# Patient Record
Sex: FEMALE | Race: WHITE | Employment: FULL TIME | URBAN - METROPOLITAN AREA
[De-identification: names, ages, dates, MRNs, and addresses within clinical notes are randomized per-mention and may not be internally consistent; named-entity substitution may affect disease eponyms.]

---

## 2017-08-16 ENCOUNTER — HOSPITAL ENCOUNTER (OUTPATIENT)
Dept: WOMENS IMAGING | Age: 52
Discharge: HOME | End: 2017-08-16
Payer: OTHER GOVERNMENT

## 2017-08-16 DIAGNOSIS — Z12.31 SCREENING MAMMOGRAM, ENCOUNTER FOR: ICD-10-CM

## 2017-08-16 PROCEDURE — G0202 SCR MAMMO BI INCL CAD: HCPCS

## 2018-08-15 DIAGNOSIS — Z01.419 PAP SMEAR, LOW-RISK: ICD-10-CM

## 2019-04-04 ENCOUNTER — HOSPITAL ENCOUNTER (OUTPATIENT)
Dept: WOMENS IMAGING | Age: 54
Discharge: HOME | End: 2019-04-06
Payer: OTHER GOVERNMENT

## 2019-04-04 DIAGNOSIS — Z12.31 BREAST CANCER SCREENING BY MAMMOGRAM: ICD-10-CM

## 2019-04-04 PROCEDURE — 77067 SCR MAMMO BI INCL CAD: CPT

## 2019-06-14 ENCOUNTER — OFFICE VISIT (OUTPATIENT)
Dept: FAMILY MEDICINE CLINIC | Age: 54
End: 2019-06-14
Payer: OTHER GOVERNMENT

## 2019-06-14 VITALS
DIASTOLIC BLOOD PRESSURE: 76 MMHG | TEMPERATURE: 97.9 F | OXYGEN SATURATION: 98 % | HEIGHT: 66 IN | BODY MASS INDEX: 17.04 KG/M2 | SYSTOLIC BLOOD PRESSURE: 128 MMHG | WEIGHT: 106 LBS | HEART RATE: 64 BPM

## 2019-06-14 DIAGNOSIS — M62.830 SPASM OF LUMBAR PARASPINOUS MUSCLE: ICD-10-CM

## 2019-06-14 DIAGNOSIS — M54.50 ACUTE BILATERAL LOW BACK PAIN WITHOUT SCIATICA: Primary | ICD-10-CM

## 2019-06-14 PROCEDURE — 99213 OFFICE O/P EST LOW 20 MIN: CPT | Performed by: NURSE PRACTITIONER

## 2019-06-14 PROCEDURE — 96372 THER/PROPH/DIAG INJ SC/IM: CPT | Performed by: NURSE PRACTITIONER

## 2019-06-14 RX ORDER — METHYLPREDNISOLONE 4 MG/1
TABLET ORAL
Qty: 1 KIT | Refills: 0 | Status: SHIPPED | OUTPATIENT
Start: 2019-06-14 | End: 2019-06-20

## 2019-06-14 RX ORDER — CYCLOBENZAPRINE HCL 10 MG
TABLET ORAL
Qty: 21 TABLET | Refills: 0 | Status: SHIPPED | OUTPATIENT
Start: 2019-06-14

## 2019-06-14 RX ORDER — LIDOCAINE 4 G/G
1 PATCH TOPICAL DAILY
Qty: 30 PATCH | Refills: 0 | Status: SHIPPED | OUTPATIENT
Start: 2019-06-14 | End: 2019-07-14

## 2019-06-14 RX ORDER — TRIAMCINOLONE ACETONIDE 40 MG/ML
60 INJECTION, SUSPENSION INTRA-ARTICULAR; INTRAMUSCULAR ONCE
Status: COMPLETED | OUTPATIENT
Start: 2019-06-14 | End: 2019-06-14

## 2019-06-14 RX ADMIN — TRIAMCINOLONE ACETONIDE 60 MG: 40 INJECTION, SUSPENSION INTRA-ARTICULAR; INTRAMUSCULAR at 15:33

## 2019-06-14 ASSESSMENT — ENCOUNTER SYMPTOMS: BACK PAIN: 1

## 2019-06-14 NOTE — PROGRESS NOTES
After obtaining consent, and per orders of Marcos Frankel, injection of Kenalog given in Right upper quad. gluteus by Ermias Newsome. Patient instructed to remain in clinic for 20 minutes afterwards, and to report any adverse reaction to me immediately.

## 2019-06-14 NOTE — PROGRESS NOTES
Subjective  Gina Cruz, 52 y.o. female presents today with:  Chief Complaint   Patient presents with    Lower Back Pain     Pt states she is having middle lower back pain, that started about a week ago. Pt has taken advil. Pt has tried heat and ice. Pt has also tried going to a chiropractor two times. Pts chiropractor thinks she has a pinched nerve and a pulled muscle and advised her to come here. Pt has tried the  Home	Parks and would like to know if should get more. Pt is a 52 y.o. female who presents for middle-lower back pain. Onset was one week ago. Symptoms are worsening since then. Pain is described as aching, intermittently sharp, non-radiating. Moderate to severe in nature. Pt has tried topical [employer] and [employer], applying ice pack to the area fo 20 minutes at a time w/o improvement. She did have some improvement w/ 5% lidocaine patch from her neighbor. She saw her chiropractor for the symptoms - he thinks she has a pinched nerve    Pt's , Bernabe Triana, is w/her at this visit. Bernabe Triana states he was here for same recently and received steroid shot w/ good relief. Review of Systems   Constitutional: Negative for chills and fever. Respiratory: Negative for chest tightness and shortness of breath. Cardiovascular: Negative for chest pain, palpitations and leg swelling. Gastrointestinal: Negative for abdominal pain, constipation, diarrhea, nausea and vomiting. Genitourinary: Negative for dysuria, enuresis, hematuria and urgency. Musculoskeletal: Positive for back pain. Negative for gait problem, joint swelling, myalgias, neck pain and neck stiffness. Skin: Negative for rash. Allergic/Immunologic: Negative for immunocompromised state. Neurological: Negative for dizziness, weakness, numbness and headaches. Hematological: Does not bruise/bleed easily.      Objective  Vitals:    06/14/19 1448   BP: 128/76   Pulse: 64   Temp: 97.9 °F (36.6 °C)   SpO2: 98%   Weight: 106 lb (48.1 kg) Height: 5' 5.5\" (1.664 m)     Physical Exam   Constitutional: She is oriented to person, place, and time. She appears well-developed and well-nourished. She is cooperative. Non-toxic appearance. No distress. HENT:   Head: Normocephalic and atraumatic. Right Ear: External ear normal.   Left Ear: External ear normal.   Nose: Nose normal.   Mouth/Throat: Oropharynx is clear and moist and mucous membranes are normal.   Eyes: Pupils are equal, round, and reactive to light. Conjunctivae, EOM and lids are normal.   Neck: Trachea normal, normal range of motion and phonation normal. Neck supple. No JVD present. Carotid bruit is not present. Cardiovascular: Normal rate, regular rhythm, S1 normal, S2 normal and normal pulses. Exam reveals no gallop and no friction rub. No murmur heard. Pulses:       Dorsalis pedis pulses are 2+ on the right side, and 2+ on the left side. Pulmonary/Chest: Effort normal and breath sounds normal. No tachypnea. She has no decreased breath sounds. She has no wheezes. She has no rales. She exhibits no tenderness. Abdominal: Soft. Normal appearance and bowel sounds are normal. She exhibits no distension. There is no tenderness. There is no CVA tenderness. Musculoskeletal: Normal range of motion. She exhibits tenderness. She exhibits no edema or deformity. Right hip: Normal.        Left hip: Normal.        Lumbar back: She exhibits tenderness and spasm. She exhibits normal range of motion, no bony tenderness, no swelling, no edema, no deformity, no laceration, no pain and normal pulse. Lymphadenopathy:     She has no cervical adenopathy. Right: No supraclavicular adenopathy present. Left: No supraclavicular adenopathy present. Neurological: She is alert and oriented to person, place, and time. She has normal strength. No sensory deficit. Coordination and gait normal.   Skin: Skin is warm, dry and intact. Capillary refill takes less than 2 seconds.  No rash noted. No erythema. Psychiatric: She has a normal mood and affect. Her speech is normal and behavior is normal.   Vitals reviewed. POC Testing Today: None    Assessment & Plan   52 y.o. female presents w/ one week of low back pain, atraumatic, afebrile. Given history and exam, suspect likely musculoskeletal etiology. Pt is nontoxic appearing w/ no red flags concerning for cauda equina. Neurovascularly intact distally. No peritoneal signs or abdominal pain w/low suspicion for AAA. Plan: in-office IM steroid, Rx muscle relaxant, lidocaine patch, medrol dose-moise to begin if symptoms are recur after initial improvement following IM kenalog, instructions for supportive care, return precautions/follow-up w/ PCP for possible imaging/work-up if symptoms persist.   Diagnoses and all orders for this visit:    Acute bilateral low back pain without sciatica  -     lidocaine 4 % external patch; Place 1 patch onto the skin daily  -     methylPREDNISolone (MEDROL, MOISE,) 4 MG tablet; Take by mouth. - Do not sit, drive, or  one place for more than 30 minutes at a time. - Take short walks on level surfaces as soon as pain allows. - Do not stay in bed. Resting more than 1 or 2 days can delay your recovery  - SEEK MEDICAL CARE IF: symptoms fail to improve in 1 week or worsen in any way  in 1 week  - SEEK IMMEDIATE MEDICAL CARE IF:   · Pain radiates from back into legs. · New bowel or bladder control problems develop  · Unusual weakness or numbness in your arms or legs  · Nausea/ vomiting, abdominal pain, feeling faint    Spasm of lumbar paraspinous muscle  -     triamcinolone acetonide (KENALOG-40) injection 60 mg  -     cyclobenzaprine (FLEXERIL) 10 MG tablet; Start with 1/2 tab. Do not drive or operate machinery while taking muscle relaxers. Oral Steroid Instructions: Take each dose with a small snack or meal to lessen potential GI upset. Follow dosing instructions provided with prescription.   Common side effects include difficulty sleeping and irritability. Take full course as ordered. Return if symptoms worsen or fail to improve, for Follow-up with your Primary Care Provider. Side effects and adverse effects of any medication prescribed today, as well as treatment plan/rationale, follow-up care, and result expectations have been discussed with the patient. Nanda Maravilla expresses understanding and wishes to proceed with the plan. Discussed signs and symptoms which require immediate follow-up in ED/call to 911. Understanding verbalized. I have reviewed and updated the electronic medical record.     Kasi Holguin, APRN - CNP

## 2019-06-14 NOTE — PATIENT INSTRUCTIONS
skin.  ? Try ice or a cold pack on the area for 10 to 20 minutes at a time. Put a thin cloth between the ice and your skin.     · For most back pain you can take over-the-counter pain medicine. Nonsteroidal anti-inflammatory drugs (NSAIDs) such as ibuprofen or naproxen seem to work best. But if you can't take NSAIDs you can try acetaminophen. Your doctor can prescribe stronger medicines if needed. Be safe with medicines. Read and follow all instructions on the label.    Body positions and posture    · Sit or lie in positions that are most comfortable for you and that reduce pain. Try one of these positions when you lie down:  ? Lie on your back with your knees bent and supported by large pillows. ? Lie on the floor with your legs on the seat of a sofa or chair. ? Lie on your side with your knees and hips bent and a pillow between your legs. ? Lie on your stomach if it does not make pain worse.     · Do not sit up in bed. Avoid soft couches and twisted positions.     · Avoid bed rest after the first day of back pain. Bed rest can help relieve pain at first, but it delays healing. Continued rest without activity is usually not good for your back.     · If you must sit for long periods of time, take breaks from sitting. Change positions every 30 minutes. Get up and walk around, or lie in a comfortable position. Activity    · Take short walks several times a day. You can start with 5 to 10 minutes, 3 or 4 times a day, and work up to longer walks. Walk on level surfaces and avoid hills and stairs until your back starts to feel better.     · After your back spasm starts to feel better, try to stretch your muscles every day, especially before and after exercise and at bedtime. Regular stretching can help relax your muscles.     · To prevent future back pain, do exercises to stretch and strengthen your back and stomach.  Learn to use good posture, safe lifting techniques, and other ways to move to help you avoid back pain.   When should you call for help? Call 911 anytime you think you may need emergency care. For example, call if:    · You are unable to move an arm or a leg at all.   [facility] your doctor now or seek immediate medical care if:    · You have new or worse symptoms in your legs, belly, or buttocks. Symptoms may include:  ? Numbness or tingling. ? Weakness. ? Pain.     · You lose bladder or bowel control.    Watch closely for changes in your health, and be sure to contact your doctor if:    · You have a fever, lose weight, or don't feel well.     · You do not get better as expected. Where can you learn more? Go to https://[employer]. org and sign in to your [employer] account. Enter E232 in the ViewRay box to learn more about \"Back Spasm: Care Instructions. \"     If you do not have an account, please click on the \"Sign Up Now\" link. Current as of: September 20, 2018  Content Version: 12.0  © 0006-3435 [employer], Incorporated. Care instructions adapted under license by [facility] [employer] [facility] ([facility] [place]). If you have questions about a medical condition or this instruction, always ask your healthcare professional. [place] [address] any warranty or liability for your use of this information.

## 2019-06-16 ENCOUNTER — TELEPHONE (OUTPATIENT)
Dept: FAMILY MEDICINE CLINIC | Age: 54
End: 2019-06-16

## 2019-06-25 ASSESSMENT — ENCOUNTER SYMPTOMS
CHEST TIGHTNESS: 0
VOMITING: 0
NAUSEA: 0
SHORTNESS OF BREATH: 0
DIARRHEA: 0
CONSTIPATION: 0
ABDOMINAL PAIN: 0

## 2020-10-13 ENCOUNTER — OFFICE VISIT (OUTPATIENT)
Dept: URBAN - METROPOLITAN AREA CLINIC 7 | Facility: CLINIC | Age: 55
End: 2020-10-13

## 2020-10-16 ENCOUNTER — LAB OUTSIDE AN ENCOUNTER (OUTPATIENT)
Age: 55
End: 2020-10-16

## 2020-10-22 ENCOUNTER — TELEPHONE ENCOUNTER (OUTPATIENT)
Dept: URBAN - METROPOLITAN AREA CLINIC 9 | Facility: CLINIC | Age: 55
End: 2020-10-22

## 2020-10-22 LAB — PANCREATIC ELASTASE-1: (no result)

## 2020-10-27 LAB — HELICOBACTER PYLORI AG, EIA, STOOL: (no result)

## 2020-11-02 ENCOUNTER — TELEPHONE ENCOUNTER (OUTPATIENT)
Dept: URBAN - METROPOLITAN AREA CLINIC 9 | Facility: CLINIC | Age: 55
End: 2020-11-02

## 2020-11-02 ENCOUNTER — OFFICE VISIT (OUTPATIENT)
Dept: URBAN - METROPOLITAN AREA CLINIC 7 | Facility: CLINIC | Age: 55
End: 2020-11-02

## 2020-11-10 ENCOUNTER — TELEPHONE ENCOUNTER (OUTPATIENT)
Dept: URBAN - METROPOLITAN AREA CLINIC 9 | Facility: CLINIC | Age: 55
End: 2020-11-10

## 2020-11-16 ENCOUNTER — TELEPHONE ENCOUNTER (OUTPATIENT)
Dept: URBAN - METROPOLITAN AREA CLINIC 9 | Facility: CLINIC | Age: 55
End: 2020-11-16

## 2020-12-08 ENCOUNTER — TELEPHONE ENCOUNTER (OUTPATIENT)
Dept: URBAN - METROPOLITAN AREA CLINIC 9 | Facility: CLINIC | Age: 55
End: 2020-12-08

## 2020-12-14 ENCOUNTER — TELEPHONE ENCOUNTER (OUTPATIENT)
Dept: URBAN - METROPOLITAN AREA CLINIC 9 | Facility: CLINIC | Age: 55
End: 2020-12-14

## 2021-01-01 ENCOUNTER — OFFICE VISIT (OUTPATIENT)
Age: 56
End: 2021-01-01

## 2021-01-21 ENCOUNTER — OFFICE VISIT (OUTPATIENT)
Dept: URBAN - METROPOLITAN AREA CLINIC 7 | Facility: CLINIC | Age: 56
End: 2021-01-21

## 2021-01-26 ENCOUNTER — TELEPHONE ENCOUNTER (OUTPATIENT)
Dept: URBAN - METROPOLITAN AREA CLINIC 9 | Facility: CLINIC | Age: 56
End: 2021-01-26

## 2021-01-26 ENCOUNTER — OFFICE VISIT (OUTPATIENT)
Dept: URBAN - METROPOLITAN AREA CLINIC 7 | Facility: CLINIC | Age: 56
End: 2021-01-26

## 2021-01-27 ENCOUNTER — OFFICE VISIT (OUTPATIENT)
Dept: URBAN - METROPOLITAN AREA CLINIC 7 | Facility: CLINIC | Age: 56
End: 2021-01-27

## 2021-03-08 ENCOUNTER — TELEPHONE ENCOUNTER (OUTPATIENT)
Dept: URBAN - METROPOLITAN AREA CLINIC 9 | Facility: CLINIC | Age: 56
End: 2021-03-08

## 2021-03-08 ENCOUNTER — OFFICE VISIT (OUTPATIENT)
Dept: URBAN - METROPOLITAN AREA CLINIC 7 | Facility: CLINIC | Age: 56
End: 2021-03-08

## 2021-08-09 PROBLEM — Z96.89 PRESENCE OF PANCREATIC DUCT STENT: Status: ACTIVE | Noted: 2020-12-04

## 2021-08-09 PROBLEM — E87.1 HYPONATREMIA: Status: ACTIVE | Noted: 2021-07-20

## 2021-08-09 PROBLEM — R11.10 INTRACTABLE VOMITING: Status: ACTIVE | Noted: 2021-08-09

## 2021-08-10 ENCOUNTER — OFFICE VISIT (OUTPATIENT)
Dept: OBGYN CLINIC | Age: 56
End: 2021-08-10
Payer: OTHER GOVERNMENT

## 2021-08-10 VITALS
HEART RATE: 86 BPM | DIASTOLIC BLOOD PRESSURE: 82 MMHG | SYSTOLIC BLOOD PRESSURE: 106 MMHG | BODY MASS INDEX: 17.37 KG/M2 | WEIGHT: 106 LBS

## 2021-08-10 DIAGNOSIS — N94.89 PELVIC CONGESTION: Primary | ICD-10-CM

## 2021-08-10 DIAGNOSIS — N94.9 GENITAL ATROPHY OF FEMALE: ICD-10-CM

## 2021-08-10 PROCEDURE — 99204 OFFICE O/P NEW MOD 45 MIN: CPT | Performed by: OBSTETRICS & GYNECOLOGY

## 2021-08-10 RX ORDER — ESTRADIOL 0.1 MG/G
CREAM VAGINAL
Qty: 1 TUBE | Refills: 3 | Status: SHIPPED | OUTPATIENT
Start: 2021-08-10

## 2021-08-10 RX ORDER — ALPRAZOLAM 1 MG/1
1 TABLET ORAL PRN
COMMUNITY
Start: 2021-01-13

## 2021-08-10 RX ORDER — ESTRADIOL 0.1 MG/D
1 FILM, EXTENDED RELEASE TRANSDERMAL
Qty: 8 PATCH | Refills: 3 | Status: SHIPPED | OUTPATIENT
Start: 2021-08-12 | End: 2022-01-13

## 2021-08-10 RX ORDER — ACETAMINOPHEN 325 MG/1
650 TABLET ORAL EVERY 6 HOURS
COMMUNITY
Start: 2021-01-31

## 2021-08-10 RX ORDER — BENAZEPRIL HYDROCHLORIDE 20 MG/1
20 TABLET ORAL DAILY
COMMUNITY
Start: 2021-07-22

## 2021-08-10 RX ORDER — LUBIPROSTONE 8 UG/1
CAPSULE, GELATIN COATED ORAL
COMMUNITY
Start: 2021-07-25

## 2021-08-10 ASSESSMENT — ENCOUNTER SYMPTOMS
BACK PAIN: 0
ABDOMINAL DISTENTION: 0
VOMITING: 0
COUGH: 0
TROUBLE SWALLOWING: 0
COLOR CHANGE: 0
ABDOMINAL PAIN: 0
SORE THROAT: 0
WHEEZING: 0
BLOOD IN STOOL: 0
SHORTNESS OF BREATH: 0
CHEST TIGHTNESS: 0
VOICE CHANGE: 0
CONSTIPATION: 0
NAUSEA: 0

## 2021-08-10 NOTE — PROGRESS NOTES
HPI:  Lennis Player (: 1965) is a 60 y.o. female, New patient, here for evaluation of the following chief complaint(s):  Follow-up (pelvic congestion found on ct)  Patient is being evaluated for bloating and epigastric and periumbilical discomfort she has had multiple pancreatic issues in the past and she had a CAT scan which showed pelvic congestion. She denies pelvic pain she does have vaginal dryness and discomfort due to the vaginal atrophy. She has not had any gynecologic problems in the past she is menopausal and has not been using any estrogen in the past      SUBJECTIVE/OBJECTIVE:    Past Surgical History:   Procedure Laterality Date    PANCREAS SURGERY          Review of Systems   Constitutional: Negative for activity change, appetite change, fatigue and unexpected weight change. HENT: Negative for dental problem, ear pain, hearing loss, nosebleeds, sore throat, trouble swallowing and voice change. Eyes: Negative for visual disturbance. Respiratory: Negative for cough, chest tightness, shortness of breath and wheezing. Cardiovascular: Negative for chest pain and palpitations. Gastrointestinal: Negative for abdominal distention, abdominal pain, blood in stool, constipation, nausea and vomiting. Endocrine: Negative for cold intolerance, heat intolerance, polydipsia, polyphagia and polyuria. Genitourinary: Negative for difficulty urinating, dyspareunia, dysuria, flank pain, frequency, genital sores, hematuria, menstrual problem, pelvic pain, urgency, vaginal bleeding, vaginal discharge and vaginal pain. Musculoskeletal: Negative for arthralgias, back pain, joint swelling and myalgias. Skin: Negative for color change and rash. Allergic/Immunologic: Negative for environmental allergies, food allergies and immunocompromised state. Neurological: Negative for dizziness, seizures, syncope, speech difficulty, weakness, numbness and headaches.    Hematological: Negative for adenopathy. Does not bruise/bleed easily. Psychiatric/Behavioral: Negative for agitation, behavioral problems, confusion, decreased concentration, dysphoric mood and suicidal ideas. The patient is not nervous/anxious and is not hyperactive. Physical Exam  Vitals and nursing note reviewed. Exam conducted with a chaperone present. Constitutional:       Appearance: She is well-developed. HENT:      Head: Normocephalic and atraumatic. Eyes:      Pupils: Pupils are equal, round, and reactive to light. Neck:      Thyroid: No thyromegaly. Trachea: No tracheal deviation. Cardiovascular:      Rate and Rhythm: Normal rate and regular rhythm. Heart sounds: Normal heart sounds. Pulmonary:      Effort: Pulmonary effort is normal.      Breath sounds: Normal breath sounds. Chest:      Chest wall: No tenderness. Abdominal:      General: Bowel sounds are normal. There is no distension. Palpations: Abdomen is soft. There is no mass. Tenderness: There is no abdominal tenderness. There is no guarding or rebound. Hernia: There is no hernia in the left inguinal area. Genitourinary:     Labia:         Right: No rash, tenderness, lesion or injury. Left: No rash, tenderness, lesion or injury. Vagina: Normal. No foreign body. No vaginal discharge, erythema, tenderness or bleeding. Cervix: No cervical motion tenderness or discharge. Uterus: Not deviated, not enlarged, not fixed and not tender. Adnexa:         Right: No mass, tenderness or fullness. Left: No mass, tenderness or fullness. Rectum: Normal. No mass, tenderness, anal fissure, external hemorrhoid or internal hemorrhoid. Normal anal tone. Musculoskeletal:         General: Normal range of motion. Cervical back: Normal range of motion. Lymphadenopathy:      Cervical: No cervical adenopathy.    Neurological:      Mental Status: She is alert and oriented to person, place, and time. Vitals:    08/10/21 1338   BP: 106/82   Pulse: 86   Weight: 106 lb (48.1 kg)         ASSESSMENT/PLAN:    Pelvic congestion  Genital atrophy  Dyspareunia    PLAN: Premarin vaginal cream half a gram nightly for a month then twice weekly  Vivelle dot 0.05 patch twice weekly patient to follow-up in 1 month and will discuss with her the use of progesterone at that time      No follow-ups on file. On this date 8/10/2021 I have spent 40 minutes reviewing previous notes, test results and face to face with the patient discussing the diagnosis and importance of compliance with the treatment plan as well as documenting on the day of the visit. An electronic signature was used to authenticate this note. Please note this report has been partially produced using speech recognition software and may cause contain errors related to that system including grammar, punctuation and spelling as well as words and phrases that may seem inappropriate. If there are questions or concerns please feel free to contact me to clarify.

## 2021-09-17 ENCOUNTER — OFFICE VISIT (OUTPATIENT)
Dept: OBGYN CLINIC | Age: 56
End: 2021-09-17
Payer: OTHER GOVERNMENT

## 2021-09-17 VITALS
HEART RATE: 88 BPM | BODY MASS INDEX: 17.37 KG/M2 | SYSTOLIC BLOOD PRESSURE: 126 MMHG | WEIGHT: 106 LBS | DIASTOLIC BLOOD PRESSURE: 80 MMHG

## 2021-09-17 DIAGNOSIS — N94.89 PELVIC CONGESTION: ICD-10-CM

## 2021-09-17 DIAGNOSIS — N94.9 GENITAL ATROPHY OF FEMALE: Primary | ICD-10-CM

## 2021-09-17 PROCEDURE — 99213 OFFICE O/P EST LOW 20 MIN: CPT | Performed by: OBSTETRICS & GYNECOLOGY

## 2021-09-17 RX ORDER — ONDANSETRON 4 MG/1
TABLET, FILM COATED ORAL
COMMUNITY
Start: 2021-08-25

## 2021-09-17 RX ORDER — PAROXETINE 10 MG/1
TABLET, FILM COATED ORAL
COMMUNITY
Start: 2021-09-07

## 2021-09-17 RX ORDER — AMITRIPTYLINE HYDROCHLORIDE 25 MG/1
TABLET, FILM COATED ORAL
COMMUNITY
Start: 2021-09-16

## 2021-09-17 RX ORDER — LINACLOTIDE 290 UG/1
CAPSULE, GELATIN COATED ORAL
COMMUNITY
Start: 2021-09-08 | End: 2022-04-12

## 2021-09-17 ASSESSMENT — ENCOUNTER SYMPTOMS
ABDOMINAL PAIN: 0
CONSTIPATION: 0
ABDOMINAL DISTENTION: 0
BLOOD IN STOOL: 0
TROUBLE SWALLOWING: 0
VOICE CHANGE: 0
COLOR CHANGE: 0
NAUSEA: 0
WHEEZING: 0
VOMITING: 0
SORE THROAT: 0
BACK PAIN: 0
SHORTNESS OF BREATH: 0
COUGH: 0
CHEST TIGHTNESS: 0

## 2021-09-17 NOTE — PROGRESS NOTES
HPI:  Elinor Bhatti (: 1965) is a 60 y.o. female, Established patient, here for evaluation of the following chief complaint(s):  Follow-up (saw GI after her last appt here. Started on meds for IBS and was reluctant to start too many meds at the same time.)  Patient was seen month or so ago and on hormone therapy for vasomotor symptoms as well as for genital atrophy. But she then saw her GI who started her on some other medications so she held off on beginning that medication. SUBJECTIVE/OBJECTIVE:    Past Surgical History:   Procedure Laterality Date    PANCREAS SURGERY          Review of Systems   Constitutional: Negative for activity change, appetite change, fatigue and unexpected weight change. HENT: Negative for dental problem, ear pain, hearing loss, nosebleeds, sore throat, trouble swallowing and voice change. Eyes: Negative for visual disturbance. Respiratory: Negative for cough, chest tightness, shortness of breath and wheezing. Cardiovascular: Negative for chest pain and palpitations. Gastrointestinal: Negative for abdominal distention, abdominal pain, blood in stool, constipation, nausea and vomiting. Endocrine: Negative for cold intolerance, heat intolerance, polydipsia, polyphagia and polyuria. Genitourinary: Negative for difficulty urinating, dyspareunia, dysuria, flank pain, frequency, genital sores, hematuria, menstrual problem, pelvic pain, urgency, vaginal bleeding, vaginal discharge and vaginal pain. Musculoskeletal: Negative for arthralgias, back pain, joint swelling and myalgias. Skin: Negative for color change and rash. Allergic/Immunologic: Negative for environmental allergies, food allergies and immunocompromised state. Neurological: Negative for dizziness, seizures, syncope, speech difficulty, weakness, numbness and headaches. Hematological: Negative for adenopathy. Does not bruise/bleed easily.    Psychiatric/Behavioral: Negative for agitation, behavioral problems, confusion, decreased concentration, dysphoric mood and suicidal ideas. The patient is not nervous/anxious and is not hyperactive. Physical Exam  Vitals and nursing note reviewed. Constitutional:       Appearance: Normal appearance. Neurological:      Mental Status: She is alert. Vitals:    09/17/21 1129   BP: 126/80   Pulse: 88   Weight: 106 lb (48.1 kg)         ASSESSMENT/PLAN:    Menopause  Vasomotor symptoms  Lower genital atrophy    PLAN: reCommend the patient begin the vaginal estrogen and follow-up in 1 month. Use half a gram nightly for 1 month and then changed to twice a week follow-up here 4 weeks. Advised that she hold off on taking the estrogen patch till she has gotten comfortable with the amitriptyline. No follow-ups on file. On this date 9/17/2021 I have spent 20 minutes reviewing previous notes, test results and face to face with the patient discussing the diagnosis and importance of compliance with the treatment plan as well as documenting on the day of the visit. An electronic signature was used to authenticate this note. Please note this report has been partially produced using speech recognition software and may cause contain errors related to that system including grammar, punctuation and spelling as well as words and phrases that may seem inappropriate. If there are questions or concerns please feel free to contact me to clarify.

## 2021-11-01 RX ORDER — POLYETHYLENE GLYCOL 3350 17 G/17G
POWDER, FOR SOLUTION ORAL
COMMUNITY
Start: 2021-02-01

## 2021-11-30 ENCOUNTER — OFFICE VISIT (OUTPATIENT)
Dept: URBAN - METROPOLITAN AREA CLINIC 7 | Facility: CLINIC | Age: 56
End: 2021-11-30

## 2021-11-30 ENCOUNTER — TELEPHONE ENCOUNTER (OUTPATIENT)
Dept: URBAN - METROPOLITAN AREA CLINIC 9 | Facility: CLINIC | Age: 56
End: 2021-11-30

## 2021-12-01 ENCOUNTER — OFFICE VISIT (OUTPATIENT)
Age: 56
End: 2021-12-01

## 2021-12-03 ENCOUNTER — TELEPHONE ENCOUNTER (OUTPATIENT)
Dept: URBAN - METROPOLITAN AREA CLINIC 9 | Facility: CLINIC | Age: 56
End: 2021-12-03

## 2021-12-15 ENCOUNTER — OFFICE VISIT (OUTPATIENT)
Dept: URBAN - METROPOLITAN AREA CLINIC 7 | Facility: CLINIC | Age: 56
End: 2021-12-15

## 2021-12-20 ENCOUNTER — OFFICE VISIT (OUTPATIENT)
Dept: URBAN - METROPOLITAN AREA CLINIC 7 | Facility: CLINIC | Age: 56
End: 2021-12-20

## 2021-12-23 ENCOUNTER — TELEPHONE ENCOUNTER (OUTPATIENT)
Dept: URBAN - METROPOLITAN AREA CLINIC 9 | Facility: CLINIC | Age: 56
End: 2021-12-23

## 2021-12-30 ENCOUNTER — TELEPHONE ENCOUNTER (OUTPATIENT)
Dept: URBAN - METROPOLITAN AREA CLINIC 9 | Facility: CLINIC | Age: 56
End: 2021-12-30

## 2022-01-04 ENCOUNTER — TELEPHONE ENCOUNTER (OUTPATIENT)
Dept: URBAN - METROPOLITAN AREA CLINIC 9 | Facility: CLINIC | Age: 57
End: 2022-01-04

## 2022-01-05 ENCOUNTER — TELEPHONE ENCOUNTER (OUTPATIENT)
Dept: URBAN - METROPOLITAN AREA CLINIC 9 | Facility: CLINIC | Age: 57
End: 2022-01-05

## 2022-01-07 ENCOUNTER — OFFICE VISIT (OUTPATIENT)
Dept: URBAN - METROPOLITAN AREA CLINIC 7 | Facility: CLINIC | Age: 57
End: 2022-01-07

## 2022-01-10 ENCOUNTER — OFFICE VISIT (OUTPATIENT)
Dept: URBAN - METROPOLITAN AREA CLINIC 7 | Facility: CLINIC | Age: 57
End: 2022-01-10

## 2022-01-12 ENCOUNTER — TELEPHONE ENCOUNTER (OUTPATIENT)
Dept: URBAN - METROPOLITAN AREA CLINIC 9 | Facility: CLINIC | Age: 57
End: 2022-01-12

## 2022-01-13 RX ORDER — ESTRADIOL 0.1 MG/D
FILM, EXTENDED RELEASE TRANSDERMAL
Qty: 25 PATCH | Refills: 3 | Status: SHIPPED | OUTPATIENT
Start: 2022-01-13

## 2022-01-19 ENCOUNTER — TELEPHONE ENCOUNTER (OUTPATIENT)
Dept: URBAN - METROPOLITAN AREA CLINIC 9 | Facility: CLINIC | Age: 57
End: 2022-01-19

## 2022-01-21 ENCOUNTER — TELEMEDICINE (OUTPATIENT)
Dept: OBGYN CLINIC | Age: 57
End: 2022-01-21
Payer: OTHER GOVERNMENT

## 2022-01-21 DIAGNOSIS — N94.9 GENITAL ATROPHY OF FEMALE: Primary | ICD-10-CM

## 2022-01-21 DIAGNOSIS — N94.89 PELVIC CONGESTION: ICD-10-CM

## 2022-01-21 PROCEDURE — 99442 PR PHYS/QHP TELEPHONE EVALUATION 11-20 MIN: CPT | Performed by: OBSTETRICS & GYNECOLOGY

## 2022-01-21 NOTE — PROGRESS NOTES
HPI:  Shira Lozano (: 1965) is a 60 y.o. female, Established patient, here for evaluation of the following chief complaint(s):  No chief complaint on file. Virtual phone visit with Dolores Quinonez she is in [place] here and I am here in the office. Was requesting my advice regarding possible surgical evaluation for a rectocele. She had a decography test done. Showed a small rectocele. She has an appointment with a new gastroenterologist to have her pancreas reevaluated and they advised that she see a urogynecologist    SUBJECTIVE/OBJECTIVE:    Past Surgical History:   Procedure Laterality Date    PANCREAS SURGERY          Review of Systems    Physical Exam    There were no vitals filed for this visit. ASSESSMENT/PLAN: Pelvic pain  Constipation  Rectocele    PLAN: Recommend patient to follow-up with the appointment for the urogynecologist.  Advised that she continue using the Premarin vaginal cream.      No follow-ups on file. On this date 2022 I have spent 20 minutes reviewing previous notes, test results and face to face with the patient discussing the diagnosis and importance of compliance with the treatment plan as well as documenting on the day of the visit. An electronic signature was used to authenticate this note. Please note this report has been partially produced using speech recognition software and may cause contain errors related to that system including grammar, punctuation and spelling as well as words and phrases that may seem inappropriate. If there are questions or concerns please feel free to contact me to clarify.

## 2022-01-28 ENCOUNTER — TELEPHONE ENCOUNTER (OUTPATIENT)
Dept: URBAN - METROPOLITAN AREA CLINIC 9 | Facility: CLINIC | Age: 57
End: 2022-01-28

## 2022-04-12 ENCOUNTER — OFFICE VISIT (OUTPATIENT)
Dept: OBGYN CLINIC | Age: 57
End: 2022-04-12
Payer: OTHER GOVERNMENT

## 2022-04-12 VITALS
SYSTOLIC BLOOD PRESSURE: 138 MMHG | DIASTOLIC BLOOD PRESSURE: 76 MMHG | WEIGHT: 106 LBS | HEART RATE: 94 BPM | BODY MASS INDEX: 17.37 KG/M2

## 2022-04-12 DIAGNOSIS — N76.0 ACUTE VAGINITIS: ICD-10-CM

## 2022-04-12 DIAGNOSIS — N76.0 ACUTE VAGINITIS: Primary | ICD-10-CM

## 2022-04-12 PROCEDURE — 99213 OFFICE O/P EST LOW 20 MIN: CPT | Performed by: OBSTETRICS & GYNECOLOGY

## 2022-04-12 RX ORDER — PANCRELIPASE LIPASE, PANCRELIPASE PROTEASE, PANCRELIPASE AMYLASE 10000; 32000; 42000 [USP'U]/1; [USP'U]/1; [USP'U]/1
CAPSULE, DELAYED RELEASE ORAL
COMMUNITY
Start: 2022-01-20

## 2022-04-12 RX ORDER — AMITRIPTYLINE HYDROCHLORIDE 10 MG/1
5 TABLET, FILM COATED ORAL
COMMUNITY
Start: 2022-01-07

## 2022-04-12 RX ORDER — OMEPRAZOLE 20 MG/1
CAPSULE, DELAYED RELEASE ORAL
COMMUNITY

## 2022-04-12 RX ORDER — LINACLOTIDE 72 UG/1
CAPSULE, GELATIN COATED ORAL
COMMUNITY
Start: 2022-01-20

## 2022-04-12 RX ORDER — ONDANSETRON 4 MG/1
TABLET, ORALLY DISINTEGRATING ORAL
COMMUNITY
Start: 2022-01-14

## 2022-04-12 RX ORDER — DULOXETIN HYDROCHLORIDE 20 MG/1
CAPSULE, DELAYED RELEASE ORAL
COMMUNITY
Start: 2022-01-13

## 2022-04-12 RX ORDER — PRUCALOPRIDE 2 MG/1
TABLET, FILM COATED ORAL
COMMUNITY

## 2022-04-12 RX ORDER — PHENOBARBITAL, HYOSCYAMINE SULFATE, ATROPINE SULFATE, SCOPOLAMINE HYDROBROMIDE .0194; .1037; 16.2; .0065 MG/5ML; MG/5ML; MG/5ML; MG/5ML
5 ELIXIR ORAL EVERY 6 HOURS PRN
COMMUNITY
Start: 2021-11-13

## 2022-04-12 RX ORDER — PSEUDOEPHEDRINE HCL 30 MG
TABLET ORAL
COMMUNITY

## 2022-04-12 ASSESSMENT — ENCOUNTER SYMPTOMS
COUGH: 0
BACK PAIN: 0
ABDOMINAL DISTENTION: 0
SHORTNESS OF BREATH: 0
VOMITING: 0
NAUSEA: 0
COLOR CHANGE: 0
WHEEZING: 0
TROUBLE SWALLOWING: 0
BLOOD IN STOOL: 0
SORE THROAT: 0
CONSTIPATION: 0
VOICE CHANGE: 0
CHEST TIGHTNESS: 0
ABDOMINAL PAIN: 0

## 2022-04-12 NOTE — PROGRESS NOTES
HPI:  Martin Daniels (: 1965) is a 60 y.o. female, Established patient, here for evaluation of the following chief complaint(s):  Vaginal Discharge (recently treated for persistant UTI with 2 rounds of ATB. no itching or odor but notices it with urination)  Ends of vaginal discharge. Has been on multiple antibiotics recently. She denies itching      SUBJECTIVE/OBJECTIVE:    Past Surgical History:   Procedure Laterality Date    PANCREAS SURGERY          Review of Systems   Constitutional: Negative for activity change, appetite change, fatigue and unexpected weight change. HENT: Negative for dental problem, ear pain, hearing loss, nosebleeds, sore throat, trouble swallowing and voice change. Eyes: Negative for visual disturbance. Respiratory: Negative for cough, chest tightness, shortness of breath and wheezing. Cardiovascular: Negative for chest pain and palpitations. Gastrointestinal: Negative for abdominal distention, abdominal pain, blood in stool, constipation, nausea and vomiting. Endocrine: Negative for cold intolerance, heat intolerance, polydipsia, polyphagia and polyuria. Genitourinary: Positive for vaginal discharge. Negative for difficulty urinating, dyspareunia, dysuria, flank pain, frequency, genital sores, hematuria, menstrual problem, pelvic pain, urgency, vaginal bleeding and vaginal pain. Musculoskeletal: Negative for arthralgias, back pain, joint swelling and myalgias. Skin: Negative for color change and rash. Allergic/Immunologic: Negative for environmental allergies, food allergies and immunocompromised state. Neurological: Negative for dizziness, seizures, syncope, speech difficulty, weakness, numbness and headaches. Hematological: Negative for adenopathy. Does not bruise/bleed easily. Psychiatric/Behavioral: Negative for agitation, behavioral problems, confusion, decreased concentration, dysphoric mood and suicidal ideas.  The patient is not nervous/anxious and is not hyperactive. Physical Exam  Vitals and nursing note reviewed. Exam conducted with a chaperone present. Genitourinary:                  Vitals:    04/12/22 1434   BP: 138/76   Pulse: 94   Weight: 106 lb (48.1 kg)         ASSESSMENT/PLAN:     Diagnosis Orders   1. Acute vaginitis  Wet prep, genital   Genital atrophy. PLAN: Prep obtained will wait for culture results. Recommend she restart the vaginal cream half a gram twice weekly. No follow-ups on file. On this date 4/12/2022 I have spent 20 minutes reviewing previous notes, test results and face to face with the patient discussing the diagnosis and importance of compliance with the treatment plan as well as documenting on the day of the visit. An electronic signature was used to authenticate this note. Please note this report has been partially produced using speech recognition software and may cause contain errors related to that system including grammar, punctuation and spelling as well as words and phrases that may seem inappropriate. If there are questions or concerns please feel free to contact me to clarify.

## 2022-04-13 LAB
CLUE CELLS: NORMAL
TRICHOMONAS PREP: NORMAL
TRICHOMONAS VAGINALIS SCREEN: NEGATIVE
YEAST WET PREP: NORMAL

## 2022-04-21 ENCOUNTER — TELEPHONE (OUTPATIENT)
Dept: OBGYN CLINIC | Age: 57
End: 2022-04-21

## 2022-04-21 NOTE — TELEPHONE ENCOUNTER
After speaking with nurse, pt was scheduled for a f/u appointment for the discharge. Pt has a dental appt scheduled next week and they want her to be on amoxicillin from  4/27-5/4. Pt is asking if there is any reason why she should not take this since the last discussion the doctor thought it may be from the antibiotics taken for UTI.  Please advise

## 2022-04-21 NOTE — TELEPHONE ENCOUNTER
Pt called back and is very worried about what is going on and wanted to speak with the doctor as a vv.   Pt was scheduled for vv on Friday 4/21/2022

## 2022-04-21 NOTE — TELEPHONE ENCOUNTER
Pt was made aware that her results of the lab tests were negative and she wants to know what would be causing the discharge as she has never had this before and it keeps happening off and on mostly when she urinates. Please advise.

## 2022-04-22 ENCOUNTER — TELEMEDICINE (OUTPATIENT)
Dept: OBGYN CLINIC | Age: 57
End: 2022-04-22
Payer: OTHER GOVERNMENT

## 2022-04-22 DIAGNOSIS — N94.9 GENITAL ATROPHY OF FEMALE: Primary | ICD-10-CM

## 2022-04-22 PROCEDURE — 99212 OFFICE O/P EST SF 10 MIN: CPT | Performed by: OBSTETRICS & GYNECOLOGY

## 2022-04-22 ASSESSMENT — ENCOUNTER SYMPTOMS
COUGH: 0
WHEEZING: 0
COLOR CHANGE: 0
CONSTIPATION: 0
VOMITING: 0
TROUBLE SWALLOWING: 0
SHORTNESS OF BREATH: 0
BLOOD IN STOOL: 0
CHEST TIGHTNESS: 0
SORE THROAT: 0
NAUSEA: 0
ABDOMINAL DISTENTION: 0
VOICE CHANGE: 0
ABDOMINAL PAIN: 0
BACK PAIN: 0

## 2022-04-22 NOTE — PROGRESS NOTES
Elinor Bhatti (: 1965) is a 60 y.o. female, Established patient, here for evaluation of the following chief complaint(s):  No chief complaint on file. Virtual phone visit. Lasted about 10 minutes. She is at home and here in the office had a permission to do it this way. We reviewed the use of the estrogen cream for the severe vaginal atrophy. She is going to use half a gram nightly for 1 month. She is going to hold off on the patch. Also discussed that the cultures were negative    SUBJECTIVE/OBJECTIVE:  HPI    Past Surgical History:   Procedure Laterality Date    PANCREAS SURGERY         Review of Systems   Constitutional: Negative for activity change, appetite change, fatigue and unexpected weight change. HENT: Negative for dental problem, ear pain, hearing loss, nosebleeds, sore throat, trouble swallowing and voice change. Eyes: Negative for visual disturbance. Respiratory: Negative for cough, chest tightness, shortness of breath and wheezing. Cardiovascular: Negative for chest pain and palpitations. Gastrointestinal: Negative for abdominal distention, abdominal pain, blood in stool, constipation, nausea and vomiting. Endocrine: Negative for cold intolerance, heat intolerance, polydipsia, polyphagia and polyuria. Genitourinary: Positive for vaginal discharge. Negative for difficulty urinating, dyspareunia, dysuria, flank pain, frequency, genital sores, hematuria, menstrual problem, pelvic pain, urgency, vaginal bleeding and vaginal pain. Musculoskeletal: Negative for arthralgias, back pain, joint swelling and myalgias. Skin: Negative for color change and rash. Allergic/Immunologic: Negative for environmental allergies, food allergies and immunocompromised state. Neurological: Negative for dizziness, seizures, syncope, speech difficulty, weakness, numbness and headaches. Hematological: Negative for adenopathy. Does not bruise/bleed easily. Psychiatric/Behavioral: Negative for agitation, behavioral problems, confusion, decreased concentration, dysphoric mood and suicidal ideas. The patient is not nervous/anxious and is not hyperactive. No flowsheet data found. Physical Exam    There were no vitals filed for this visit. ASSESSMENT:PLAN  Severe genital atrophy  Continue the Premarin cream half a gram once a night for a month and then follow-up in 1 month for reevaluation of the vaginal mucosa  On this date 4/22/2022 I have spent 10 minutes reviewing previous notes, test results and face to face (virtual) with the patient discussing the diagnosis and importance of compliance with the treatment plan as well as documenting on the day of the visit. Anne Bateman is a 60 y.o. female being evaluated by a Virtual Visit (video visit) encounter to address concerns as mentioned above. A caregiver was present when appropriate. Due to this being a TeleHealth encounter (During ZWROS-08 public health emergency), evaluation of the following organ systems was limited: Vitals/Constitutional/EENT/Resp/CV/GI//MS/Neuro/Skin/Heme-Lymph-Imm. Pursuant to the emergency declaration under the [address] [place] [address], [address] [place] [address] authority and the [employer] Act, this Virtual Visit was conducted with patient's (and/or legal guardian's) consent, to reduce the patient's risk of exposure to COVID-19 and provide necessary medical care. The patient (and/or legal guardian) has also been advised to contact this office for worsening conditions or problems, and seek emergency medical treatment and/or call 911 if deemed necessary. Patient identification was verified at the start of the visit: Yes    Services were provided through a video synchronous discussion virtually to substitute for in-person clinic visit.  Patient was located at home and provider was located in office or at home. An electronic signature was used to authenticate this note.

## 2022-06-27 ENCOUNTER — TELEPHONE ENCOUNTER (OUTPATIENT)
Dept: URBAN - METROPOLITAN AREA CLINIC 9 | Facility: CLINIC | Age: 57
End: 2022-06-27

## 2022-07-30 ENCOUNTER — TELEPHONE ENCOUNTER (OUTPATIENT)
Age: 57
End: 2022-07-30

## 2022-07-30 RX ORDER — ONDANSETRON HYDROCHLORIDE 4 MG/1
1 (ONE) TABLET, FILM COATED ORAL AS NEEDED
Qty: 0 | Refills: 5 | OUTPATIENT
Start: 2021-03-08 | End: 2021-12-15

## 2022-07-30 RX ORDER — LUBIPROSTONE 8 UG/1
2 (TWO) CAPSULE, GELATIN COATED ORAL
Qty: 0 | Refills: 2 | OUTPATIENT
Start: 2021-11-30 | End: 2021-12-15

## 2022-07-30 RX ORDER — CHLORDIAZEPOXIDE HYDROCHLORIDE AND CLIDINIUM BROMIDE 5; 2.5 MG/1; MG/1
1 TO 2 CAPSULE ORAL
Qty: 0 | Refills: 2 | OUTPATIENT
Start: 2021-12-01 | End: 2021-12-11

## 2022-07-30 RX ORDER — TRAMADOL HYDROCHLORIDE 100 MG/1
1 (ONE) TABLET, COATED ORAL
Qty: 0 | Refills: 2 | OUTPATIENT
Start: 2020-11-09 | End: 2020-11-19

## 2022-07-31 ENCOUNTER — TELEPHONE ENCOUNTER (OUTPATIENT)
Age: 57
End: 2022-07-31

## 2022-07-31 RX ORDER — CHLORDIAZEPOXIDE HYDROCHLORIDE AND CLIDINIUM BROMIDE 5; 2.5 MG/1; MG/1
1 TO 2 CAPSULE ORAL
Qty: 0 | Refills: 2 | Status: ACTIVE | COMMUNITY
Start: 2021-12-01

## 2022-07-31 RX ORDER — TRAMADOL HYDROCHLORIDE 100 MG/1
1 (ONE) TABLET, COATED ORAL
Qty: 0 | Refills: 2 | Status: ACTIVE | COMMUNITY
Start: 2020-11-09

## 2022-07-31 RX ORDER — PRUCALOPRIDE 2 MG/1
1 (ONE) TABLET, FILM COATED ORAL DAILY
Qty: 0 | Refills: 5 | Status: ACTIVE | COMMUNITY
Start: 2021-12-15

## 2022-07-31 RX ORDER — DULOXETINE 20 MG/1
1 (ONE) CAPSULE, DELAYED RELEASE PELLETS ORAL DAILY
Qty: 0 | Refills: 5 | Status: ACTIVE | COMMUNITY
Start: 2021-12-15

## 2022-07-31 RX ORDER — CHLORDIAZEPOXIDE HYDROCHLORIDE AND CLIDINIUM BROMIDE 5; 2.5 MG/1; MG/1
1 (ONE) CAPSULE ORAL AS NEEDED
Qty: 0 | Refills: 2 | Status: ACTIVE | COMMUNITY
Start: 2021-11-30

## 2022-07-31 RX ORDER — PRUCALOPRIDE 2 MG/1
1 (ONE) TABLET, FILM COATED ORAL DAILY
Qty: 0 | Refills: 5 | Status: ACTIVE | COMMUNITY
Start: 2021-12-18

## 2022-07-31 RX ORDER — LUBIPROSTONE 8 UG/1
2 (TWO) CAPSULE, GELATIN COATED ORAL
Qty: 0 | Refills: 2 | Status: ACTIVE | COMMUNITY
Start: 2021-11-30

## 2022-07-31 RX ORDER — AMITRIPTYLINE HYDROCHLORIDE 10 MG/1
1 (ONE) TABLET, FILM COATED ORAL AT BEDTIME
Qty: 0 | Refills: 5 | Status: ACTIVE | COMMUNITY
Start: 2022-01-07

## 2022-07-31 RX ORDER — PROMETHAZINE HYDROCHLORIDE 12.5 MG/1
1 (ONE) TABLET ORAL
Qty: 0 | Refills: 5 | Status: ACTIVE | COMMUNITY
Start: 2021-11-30

## 2022-07-31 RX ORDER — ONDANSETRON HYDROCHLORIDE 4 MG/1
1 (ONE) TABLET, FILM COATED ORAL AS NEEDED
Qty: 0 | Refills: 5 | Status: ACTIVE | COMMUNITY
Start: 2021-03-08

## 2022-07-31 RX ORDER — ONDANSETRON 4 MG/1
1 (ONE) TABLET, ORALLY DISINTEGRATING ORAL
Qty: 0 | Refills: 5 | Status: ACTIVE | COMMUNITY
Start: 2022-06-27

## 2022-07-31 RX ORDER — LUBIPROSTONE 8 UG/1
1 (ONE) CAPSULE, GELATIN COATED ORAL
Qty: 0 | Refills: 16 | Status: ACTIVE | COMMUNITY
Start: 2020-10-13

## 2022-07-31 RX ORDER — ONDANSETRON 4 MG/1
1 (ONE) TABLET, ORALLY DISINTEGRATING ORAL
Qty: 0 | Refills: 5 | Status: ACTIVE | COMMUNITY
Start: 2021-11-30

## 2022-07-31 RX ORDER — PROMETHAZINE HYDROCHLORIDE 12.5 MG/1
1 TO 2 TABLET ORAL
Qty: 0 | Refills: 5 | Status: ACTIVE | COMMUNITY
Start: 2020-10-13

## 2022-08-11 ENCOUNTER — TELEPHONE ENCOUNTER (OUTPATIENT)
Dept: URBAN - METROPOLITAN AREA CLINIC 7 | Facility: CLINIC | Age: 57
End: 2022-08-11

## 2022-08-11 ENCOUNTER — OFFICE VISIT (OUTPATIENT)
Dept: URBAN - METROPOLITAN AREA TELEHEALTH 1 | Facility: TELEHEALTH | Age: 57
End: 2022-08-11
Payer: COMMERCIAL

## 2022-08-11 DIAGNOSIS — R10.9 FUNCTIONAL ABDOMINAL PAIN SYNDROME: ICD-10-CM

## 2022-08-11 DIAGNOSIS — M62.89 PELVIC FLOOR DYSFUNCTION IN FEMALE: ICD-10-CM

## 2022-08-11 DIAGNOSIS — K58.1 IRRITABLE BOWEL SYNDROME WITH CONSTIPATION: ICD-10-CM

## 2022-08-11 DIAGNOSIS — R19.8 VISCERAL HYPERALGESIA: ICD-10-CM

## 2022-08-11 DIAGNOSIS — R14.0 BLOATING: ICD-10-CM

## 2022-08-11 DIAGNOSIS — K59.00 CONSTIPATION, UNSPECIFIED CONSTIPATION TYPE: ICD-10-CM

## 2022-08-11 DIAGNOSIS — R93.5 ABNORMAL US (ULTRASOUND) OF ABDOMEN: ICD-10-CM

## 2022-08-11 PROCEDURE — 99213 OFFICE O/P EST LOW 20 MIN: CPT | Performed by: INTERNAL MEDICINE

## 2022-08-11 RX ORDER — TRAMADOL HYDROCHLORIDE 100 MG/1
1 (ONE) TABLET, COATED ORAL
Qty: 0 | Refills: 2 | Status: ACTIVE | COMMUNITY
Start: 2020-11-09

## 2022-08-11 RX ORDER — ONDANSETRON 4 MG/1
1 (ONE) TABLET, ORALLY DISINTEGRATING ORAL
Qty: 0 | Refills: 5 | Status: ACTIVE | COMMUNITY
Start: 2022-06-27

## 2022-08-11 RX ORDER — LINACLOTIDE 72 UG/1
1 CAPSULE AT LEAST 30 MINUTES BEFORE THE FIRST MEAL OF THE DAY ON AN EMPTY STOMACH CAPSULE, GELATIN COATED ORAL ONCE A DAY
Qty: 30 | Refills: 3 | OUTPATIENT
Start: 2022-08-11 | End: 2022-12-08

## 2022-08-11 RX ORDER — DULOXETINE 20 MG/1
1 (ONE) CAPSULE, DELAYED RELEASE PELLETS ORAL DAILY
Qty: 0 | Refills: 5 | Status: ACTIVE | COMMUNITY
Start: 2021-12-15

## 2022-08-11 RX ORDER — PRUCALOPRIDE 2 MG/1
1 (ONE) TABLET, FILM COATED ORAL DAILY
Qty: 0 | Refills: 5 | Status: ACTIVE | COMMUNITY
Start: 2021-12-15

## 2022-08-11 RX ORDER — LUBIPROSTONE 8 UG/1
1 (ONE) CAPSULE, GELATIN COATED ORAL
Qty: 0 | Refills: 16 | Status: ACTIVE | COMMUNITY
Start: 2020-10-13

## 2022-08-11 RX ORDER — ONDANSETRON HYDROCHLORIDE 4 MG/1
1 (ONE) TABLET, FILM COATED ORAL AS NEEDED
Qty: 0 | Refills: 5 | Status: ACTIVE | COMMUNITY
Start: 2021-03-08

## 2022-08-11 RX ORDER — PROMETHAZINE HYDROCHLORIDE 12.5 MG/1
1 TO 2 TABLET ORAL
Qty: 0 | Refills: 5 | Status: ACTIVE | COMMUNITY
Start: 2020-10-13

## 2022-08-11 RX ORDER — CHLORDIAZEPOXIDE HYDROCHLORIDE AND CLIDINIUM BROMIDE 5; 2.5 MG/1; MG/1
1 TO 2 CAPSULE ORAL
Qty: 0 | Refills: 2 | Status: ACTIVE | COMMUNITY
Start: 2021-12-01

## 2022-08-11 RX ORDER — AMITRIPTYLINE HYDROCHLORIDE 10 MG/1
1 (ONE) TABLET, FILM COATED ORAL AT BEDTIME
Qty: 0 | Refills: 5 | Status: ACTIVE | COMMUNITY
Start: 2022-01-07

## 2022-08-11 NOTE — HPI-TODAY'S VISIT:
Patient was last seen in the office January 2022.  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  February 2019 EGD EUS with 2 cm hiatal hernia, Schatzki ring, antral gastropathy with H. pylori biopsies negative, and normal duodenum.  She also had an endoscopy in [place] in 2021 with biopsies once again negative for H. pylori and celiac disease.  Colonoscopy August 2021 demonstrated diverticulosis and otherwise normal colon and ileum with the exception of internal hemorrhoids.  EUS in August 2021 demonstrated normal duodenum, no pathology in the pancreatic head, no bile duct pathology, and no pancreatic duct pathology.  CT scan July 2021 with contrast demonstrated evidence of a distal pancreatectomy and splenectomy and was otherwise normal.  Over her last few visits with me, she has been in tears during the visit stating her abdominal pain is overwhelming and she has demonstrated significant visceral hypersensitivity.  Her weight was 107 earlier in 2021 and was 103 last visit.  She had a bad reaction to amitriptyline, Trulance was ineffective, and low-dose Amitiza was not effective.  Also Linzess 290 was not effective.  She was using benzos and alcohol to help her pain.  Small intestinal bacterial overgrowth testing was negative.  Librax was used for pain as well as Zofran Phenergan.  Despite all this she is continued with symptoms including cramping pain and constipation.  Duloxetine was also started and she felt weird with it so she stopped it.  She had an MRI difficult [place] in December 2021 which demonstrated incomplete evacuation with a third or more of the contrast gel left in her rectum despite strong effort, moderate to severe pelvic floor descent, moderate rectocele measuring 2.3 cm, and possible paradoxical contraction.  Last visit, she was on 2 mg of Motegrity and having some bowel movements but still continue with issues with abdominal pain and urgency.  She felt like she had lots of intestinal gas.  Advised at that time that she continue her bowel regimen with Motegrity MiraLAX and Benefiber, stop duloxetine, and referred her to pelvic floor physical therapy as well as urogynecology given her rectocele.  Overall impression was that of a very complex patient with ongoing pain symptoms that are chronic and have been refractory to treatment.  Also advised that she see pain management for assistance with pain modulating medications and her overall chronic pain syndrome.  Since her last visit she did see urogynecology who also referred her to pelvic floor physical therapy and suggested doing urinary testing.  I also suggested that she use plant-based proteins and referred her previously to dietitian but she deferred this.  She also had a CT scan of the abdomen pelvis which was done in January 2022 which demonstrated a prior splenectomy and atrophic pancreatic tail and pelvic varices. LV she was following up as she had seen a [facility] doctor and had some imaging done. She is having pain after meals. Her weight is about 104. She continues with post-prandial pain, teary eyed during visit. This is chronic abdominal pain at this point, several years worth of this. She tried Linzess and it gave her diarrhea, and pain persisted so she stopped the medication. She had pelvic floor physicial therapy for 8 sessions.

## 2022-08-16 ENCOUNTER — TELEPHONE ENCOUNTER (OUTPATIENT)
Dept: URBAN - METROPOLITAN AREA CLINIC 7 | Facility: CLINIC | Age: 57
End: 2022-08-16

## 2022-09-19 ENCOUNTER — TELEPHONE ENCOUNTER (OUTPATIENT)
Dept: URBAN - METROPOLITAN AREA CLINIC 7 | Facility: CLINIC | Age: 57
End: 2022-09-19

## 2022-09-23 ENCOUNTER — OFFICE VISIT (OUTPATIENT)
Dept: URBAN - METROPOLITAN AREA CLINIC 7 | Facility: CLINIC | Age: 57
End: 2022-09-23
Payer: COMMERCIAL

## 2022-09-23 ENCOUNTER — TELEPHONE ENCOUNTER (OUTPATIENT)
Dept: URBAN - METROPOLITAN AREA CLINIC 7 | Facility: CLINIC | Age: 57
End: 2022-09-23

## 2022-09-23 VITALS
TEMPERATURE: 98 F | BODY MASS INDEX: 17.36 KG/M2 | DIASTOLIC BLOOD PRESSURE: 82 MMHG | WEIGHT: 108 LBS | HEIGHT: 66 IN | SYSTOLIC BLOOD PRESSURE: 122 MMHG

## 2022-09-23 DIAGNOSIS — R14.0 BLOATING: ICD-10-CM

## 2022-09-23 DIAGNOSIS — Z90.411 HISTORY OF PARTIAL PANCREATECTOMY: ICD-10-CM

## 2022-09-23 DIAGNOSIS — R10.9 ABDOMINAL DISCOMFORT: ICD-10-CM

## 2022-09-23 DIAGNOSIS — M62.89 PELVIC FLOOR DYSFUNCTION: ICD-10-CM

## 2022-09-23 DIAGNOSIS — K58.0 IRRITABLE BOWEL SYNDROME WITH DIARRHEA: ICD-10-CM

## 2022-09-23 PROBLEM — 197125005: Status: ACTIVE | Noted: 2022-09-23

## 2022-09-23 PROBLEM — 43364001: Status: ACTIVE | Noted: 2022-09-23

## 2022-09-23 PROCEDURE — 99214 OFFICE O/P EST MOD 30 MIN: CPT | Performed by: STUDENT IN AN ORGANIZED HEALTH CARE EDUCATION/TRAINING PROGRAM

## 2022-09-23 RX ORDER — TRAMADOL HYDROCHLORIDE 100 MG/1
1 (ONE) TABLET, COATED ORAL
Qty: 0 | Refills: 2 | Status: DISCONTINUED | COMMUNITY
Start: 2020-11-09

## 2022-09-23 RX ORDER — CHLORDIAZEPOXIDE HYDROCHLORIDE AND CLIDINIUM BROMIDE 5; 2.5 MG/1; MG/1
1 TO 2 CAPSULE ORAL
Qty: 0 | Refills: 2 | Status: DISCONTINUED | COMMUNITY
Start: 2021-12-01

## 2022-09-23 RX ORDER — ALPRAZOLAM 1 MG/1
TABLET ORAL
Qty: 30 TABLET | Status: ACTIVE | COMMUNITY

## 2022-09-23 RX ORDER — PROMETHAZINE HYDROCHLORIDE 12.5 MG/1
1 TO 2 TABLET ORAL
Qty: 0 | Refills: 5 | Status: DISCONTINUED | COMMUNITY
Start: 2020-10-13

## 2022-09-23 RX ORDER — ONDANSETRON HYDROCHLORIDE 4 MG/1
1 (ONE) TABLET, FILM COATED ORAL AS NEEDED
Qty: 0 | Refills: 5 | Status: DISCONTINUED | COMMUNITY
Start: 2021-03-08

## 2022-09-23 RX ORDER — LUBIPROSTONE 8 UG/1
1 (ONE) CAPSULE, GELATIN COATED ORAL
Qty: 0 | Refills: 16 | Status: DISCONTINUED | COMMUNITY
Start: 2020-10-13

## 2022-09-23 RX ORDER — DULOXETINE 20 MG/1
1 (ONE) CAPSULE, DELAYED RELEASE PELLETS ORAL DAILY
Qty: 0 | Refills: 5 | Status: DISCONTINUED | COMMUNITY
Start: 2021-12-15

## 2022-09-23 RX ORDER — DICYCLOMINE HYDROCHLORIDE 20 MG/1
1 TABLET TABLET ORAL THREE TIMES A DAY
Qty: 90 | Refills: 0

## 2022-09-23 RX ORDER — BETAMETHASONE VALERATE 1 MG/G
APPLY TOPICALLY TO THE AFFECTED AREA TWICE DAILY AS NEEDED CREAM TOPICAL
Qty: 45 GRAM | Refills: 0 | Status: ACTIVE | COMMUNITY

## 2022-09-23 RX ORDER — PRUCALOPRIDE 2 MG/1
1 (ONE) TABLET, FILM COATED ORAL DAILY
Qty: 0 | Refills: 5 | Status: DISCONTINUED | COMMUNITY
Start: 2021-12-15

## 2022-09-23 RX ORDER — DICYCLOMINE HYDROCHLORIDE 20 MG/1
1 TABLET TABLET ORAL THREE TIMES A DAY
Qty: 90 | Status: ACTIVE | COMMUNITY
Start: 2022-09-23 | End: 2022-10-23

## 2022-09-23 RX ORDER — BENAZEPRIL HYDROCHLORIDE 20 MG/1
TABLET, FILM COATED ORAL
Qty: 90 TABLET | Status: ACTIVE | COMMUNITY

## 2022-09-23 RX ORDER — AMITRIPTYLINE HYDROCHLORIDE 10 MG/1
1 (ONE) TABLET, FILM COATED ORAL AT BEDTIME
Qty: 0 | Refills: 5 | Status: ACTIVE | COMMUNITY
Start: 2022-01-07

## 2022-09-23 RX ORDER — LINACLOTIDE 72 UG/1
1 CAPSULE AT LEAST 30 MINUTES BEFORE THE FIRST MEAL OF THE DAY ON AN EMPTY STOMACH CAPSULE, GELATIN COATED ORAL ONCE A DAY
Qty: 30 | Refills: 3 | Status: ACTIVE | COMMUNITY
Start: 2022-08-11 | End: 2022-12-08

## 2022-09-23 RX ORDER — BUDESONIDE 3 MG/1
2 CAPSULES CAPSULE ORAL ONCE A DAY
Qty: 60 | Refills: 1 | OUTPATIENT

## 2022-09-23 RX ORDER — ONDANSETRON 4 MG/1
1 (ONE) TABLET, ORALLY DISINTEGRATING ORAL
Qty: 0 | Refills: 5 | Status: ACTIVE | COMMUNITY
Start: 2022-06-27

## 2022-09-23 NOTE — HPI-TODAY'S VISIT:
55 YO female presents for abdominal pain.  Has been having on-going abdominal pain for over 4 years.  Previous diagnosed with chronic pancreatitis and had a stent placed with Dr. Peguero.  Previously seen by Dr. Cervantes.  Stent fragment retained in pacreatic tail - ultimately had distal pancreatectomy and splenectomy previously.  Recent CTA with significantly narrowed left renal vein.  SMA syndrome ruled out.   Previously ruled out chronic pancreatitis.  Has had 4 ERCP in the past.  Now patient's main concern is loose bowel movements and abdominal pain.  Patient has a long history of medications trials which have not worked in the past.  She has seen a bumber of different GI doctors at [facility] in [place].  Has recently tried strating back on Linzess 72mcg.  Advised to continue trial, patient wanting repeat of pancreatic enzymes levels (which were normal on teitng post distal pancreatectomy), and advised therapeutic trial of Budesonide for symptomatic relief.  ALARM SYMPTOMS --No odynophagia --No hematemesis --No melena / hematochezia --No unintentional weight loss --No iron deficiency anemia  PERTINENT MEDICAL HISTORY Aspirin 81mg PO daily:   --Not Taking Other Blood Thinners:   --Not Taking Diabetes Medication:   --No History  Cardiac Disease:   --No History  Pulmonary Disease --No History  Abdominal Surgery & Hernia:  No History   PERTINENT GI FAMILY HISTORY Colon polyps:  no family history Colon cancer:  no family history   GASTROINTESTINAL PROCEDURE HISTORY Colonoscopy:	 --8/2021  EGD:   --8/2021

## 2022-09-26 ENCOUNTER — ERX REFILL RESPONSE (OUTPATIENT)
Dept: URBAN - METROPOLITAN AREA CLINIC 7 | Facility: CLINIC | Age: 57
End: 2022-09-26

## 2022-09-26 RX ORDER — DICYCLOMINE HYDROCHLORIDE 20 MG/1
TAKE 1 TABLET BY MOUTH THREE TIMES DAILY TABLET ORAL
Qty: 270 TABLET | Refills: 0 | OUTPATIENT

## 2022-09-26 RX ORDER — DICYCLOMINE HYDROCHLORIDE 20 MG/1
1 TABLET TABLET ORAL THREE TIMES A DAY
Qty: 90 | Refills: 0 | OUTPATIENT

## 2022-10-18 ENCOUNTER — WEB ENCOUNTER (OUTPATIENT)
Dept: URBAN - METROPOLITAN AREA CLINIC 63 | Facility: CLINIC | Age: 57
End: 2022-10-18

## 2022-10-22 LAB — PANCREATIC ELASTASE, FECAL: 361

## 2022-11-02 ENCOUNTER — OFFICE VISIT (OUTPATIENT)
Dept: URBAN - METROPOLITAN AREA CLINIC 60 | Facility: CLINIC | Age: 57
End: 2022-11-02

## 2022-12-01 ENCOUNTER — WEB ENCOUNTER (OUTPATIENT)
Dept: URBAN - METROPOLITAN AREA CLINIC 60 | Facility: CLINIC | Age: 57
End: 2022-12-01

## 2022-12-07 ENCOUNTER — TELEPHONE ENCOUNTER (OUTPATIENT)
Dept: URBAN - METROPOLITAN AREA CLINIC 60 | Facility: CLINIC | Age: 57
End: 2022-12-07

## 2022-12-07 ENCOUNTER — OFFICE VISIT (OUTPATIENT)
Dept: URBAN - METROPOLITAN AREA CLINIC 7 | Facility: CLINIC | Age: 57
End: 2022-12-07

## 2022-12-07 RX ORDER — BUDESONIDE 3 MG/1
2 CAPSULES CAPSULE ORAL ONCE A DAY
Qty: 60 | Refills: 1 | COMMUNITY

## 2022-12-07 RX ORDER — ALPRAZOLAM 1 MG/1
TABLET ORAL
Qty: 30 TABLET | COMMUNITY

## 2022-12-07 RX ORDER — LINACLOTIDE 72 UG/1
1 CAPSULE AT LEAST 30 MINUTES BEFORE THE FIRST MEAL OF THE DAY ON AN EMPTY STOMACH CAPSULE, GELATIN COATED ORAL ONCE A DAY
Qty: 30 | Refills: 3 | COMMUNITY
Start: 2022-08-11 | End: 2022-12-08

## 2022-12-07 RX ORDER — BETAMETHASONE VALERATE 1 MG/G
APPLY TOPICALLY TO THE AFFECTED AREA TWICE DAILY AS NEEDED CREAM TOPICAL
Qty: 45 GRAM | Refills: 0 | COMMUNITY

## 2022-12-07 RX ORDER — AMITRIPTYLINE HYDROCHLORIDE 10 MG/1
1 (ONE) TABLET, FILM COATED ORAL AT BEDTIME
Qty: 0 | Refills: 5 | COMMUNITY
Start: 2022-01-07

## 2022-12-07 RX ORDER — DICYCLOMINE HYDROCHLORIDE 20 MG/1
TAKE 1 TABLET BY MOUTH THREE TIMES DAILY TABLET ORAL
Qty: 270 TABLET | Refills: 0 | COMMUNITY

## 2022-12-07 RX ORDER — BENAZEPRIL HYDROCHLORIDE 20 MG/1
TABLET, FILM COATED ORAL
Qty: 90 TABLET | COMMUNITY

## 2022-12-07 RX ORDER — ONDANSETRON 4 MG/1
1 (ONE) TABLET, ORALLY DISINTEGRATING ORAL
Qty: 0 | Refills: 5 | COMMUNITY
Start: 2022-06-27

## 2022-12-09 ENCOUNTER — TELEPHONE ENCOUNTER (OUTPATIENT)
Dept: URBAN - METROPOLITAN AREA CLINIC 7 | Facility: CLINIC | Age: 57
End: 2022-12-09

## 2022-12-09 ENCOUNTER — OFFICE VISIT (OUTPATIENT)
Dept: URBAN - METROPOLITAN AREA CLINIC 7 | Facility: CLINIC | Age: 57
End: 2022-12-09
Payer: COMMERCIAL

## 2022-12-09 VITALS
SYSTOLIC BLOOD PRESSURE: 118 MMHG | DIASTOLIC BLOOD PRESSURE: 70 MMHG | TEMPERATURE: 97.5 F | WEIGHT: 109 LBS | HEIGHT: 66 IN | BODY MASS INDEX: 17.52 KG/M2

## 2022-12-09 DIAGNOSIS — M62.89 PELVIC FLOOR DYSFUNCTION IN FEMALE: ICD-10-CM

## 2022-12-09 DIAGNOSIS — R14.0 BLOATING: ICD-10-CM

## 2022-12-09 DIAGNOSIS — R19.8 VISCERAL HYPERALGESIA: ICD-10-CM

## 2022-12-09 DIAGNOSIS — T81.89XS SUTURE GRANULOMA, SEQUELA: ICD-10-CM

## 2022-12-09 DIAGNOSIS — R93.5 ABNORMAL US (ULTRASOUND) OF ABDOMEN: ICD-10-CM

## 2022-12-09 DIAGNOSIS — I77.4 MEDIAN ARCUATE LIGAMENT SYNDROME: ICD-10-CM

## 2022-12-09 DIAGNOSIS — K59.00 CONSTIPATION, UNSPECIFIED CONSTIPATION TYPE: ICD-10-CM

## 2022-12-09 DIAGNOSIS — K63.89 SMALL INTESTINAL BACTERIAL OVERGROWTH (SIBO): ICD-10-CM

## 2022-12-09 DIAGNOSIS — K58.1 IRRITABLE BOWEL SYNDROME WITH CONSTIPATION: ICD-10-CM

## 2022-12-09 DIAGNOSIS — R10.9 FUNCTIONAL ABDOMINAL PAIN SYNDROME: ICD-10-CM

## 2022-12-09 PROBLEM — 14760008: Status: ACTIVE | Noted: 2022-08-11

## 2022-12-09 PROBLEM — 9250002: Status: ACTIVE | Noted: 2022-12-09

## 2022-12-09 PROBLEM — 66962008: Status: ACTIVE | Noted: 2022-12-09

## 2022-12-09 PROCEDURE — 99215 OFFICE O/P EST HI 40 MIN: CPT | Performed by: INTERNAL MEDICINE

## 2022-12-09 RX ORDER — BETAMETHASONE VALERATE 1 MG/G
APPLY TOPICALLY TO THE AFFECTED AREA TWICE DAILY AS NEEDED CREAM TOPICAL
Qty: 45 GRAM | Refills: 0 | Status: ON HOLD | COMMUNITY

## 2022-12-09 RX ORDER — PLECANATIDE 3 MG/1
1 TABLET TABLET ORAL ONCE A DAY
Qty: 30 | Refills: 3 | OUTPATIENT
Start: 2022-12-09 | End: 2023-04-08

## 2022-12-09 RX ORDER — METRONIDAZOLE 250 MG/1
1 TABLET TABLET ORAL THREE TIMES A DAY
Qty: 30 | Refills: 0 | OUTPATIENT
Start: 2022-12-09 | End: 2022-12-19

## 2022-12-09 RX ORDER — ALPRAZOLAM 1 MG/1
1 TABLET TABLET ORAL TWICE A DAY
Status: ACTIVE | COMMUNITY

## 2022-12-09 RX ORDER — ALPRAZOLAM 1 MG/1
TABLET ORAL
Qty: 30 TABLET | Status: ON HOLD | COMMUNITY

## 2022-12-09 RX ORDER — BUDESONIDE 3 MG/1
2 CAPSULES CAPSULE ORAL ONCE A DAY
Qty: 60 | Refills: 1 | Status: ON HOLD | COMMUNITY

## 2022-12-09 RX ORDER — DICYCLOMINE HYDROCHLORIDE 20 MG/1
1 TABLET TABLET ORAL THREE TIMES A DAY
Status: ACTIVE | COMMUNITY

## 2022-12-09 RX ORDER — ONDANSETRON 4 MG/1
1 (ONE) TABLET, ORALLY DISINTEGRATING ORAL
Qty: 0 | Refills: 5 | Status: ON HOLD | COMMUNITY
Start: 2022-06-27

## 2022-12-09 RX ORDER — BENAZEPRIL HYDROCHLORIDE 20 MG/1
1 TABLET TABLET ORAL ONCE A DAY
Status: ACTIVE | COMMUNITY

## 2022-12-09 RX ORDER — ESTRADIOL 0.1 MG/G
AS DIRECTED CREAM VAGINAL
Status: ACTIVE | COMMUNITY

## 2022-12-09 RX ORDER — FLUCONAZOLE 200 MG/1
1 TABLET TABLET ORAL ONCE DAILY
Qty: 14 | Refills: 0 | OUTPATIENT
Start: 2022-12-09 | End: 2022-12-23

## 2022-12-09 RX ORDER — DICYCLOMINE HYDROCHLORIDE 20 MG/1
TAKE 1 TABLET BY MOUTH THREE TIMES DAILY TABLET ORAL
Qty: 270 TABLET | Refills: 0 | COMMUNITY

## 2022-12-09 RX ORDER — AMITRIPTYLINE HYDROCHLORIDE 10 MG/1
1 (ONE) TABLET, FILM COATED ORAL AT BEDTIME
Qty: 0 | Refills: 5 | Status: ON HOLD | COMMUNITY
Start: 2022-01-07

## 2022-12-09 RX ORDER — BENAZEPRIL HYDROCHLORIDE 20 MG/1
TABLET, FILM COATED ORAL
Qty: 90 TABLET | Status: ON HOLD | COMMUNITY

## 2022-12-09 RX ORDER — POLYETHYLENE GLYCOL 3350 17 G/17G
AS DIRECTED POWDER, FOR SOLUTION ORAL
Status: ACTIVE | COMMUNITY

## 2022-12-09 NOTE — HPI-TODAY'S VISIT:
Patient was last seen in the office January 2022.  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  February 2019 EGD EUS with 2 cm hiatal hernia, Schatzki ring, antral gastropathy with H. pylori biopsies negative, and normal duodenum.  She also had an endoscopy in [place] in 2021 with biopsies once again negative for H. pylori and celiac disease.  Colonoscopy August 2021 demonstrated diverticulosis and otherwise normal colon and ileum with the exception of internal hemorrhoids.  EUS in August 2021 demonstrated normal duodenum, no pathology in the pancreatic head, no bile duct pathology, and no pancreatic duct pathology.  CT scan July 2021 with contrast demonstrated evidence of a distal pancreatectomy and splenectomy and was otherwise normal.  Very functional presentation, with emotional presentations over the last several years. Over her last few visits with me, she has been in tears during the visit stating her abdominal pain is overwhelming and she has demonstrated significant visceral hypersensitivity. She had a bad reaction to amitriptyline, failed Trulance, Linzess.  She was using benzos and alcohol to help her pain in the past.  Small intestinal bacterial overgrowth testing was negative.  Librax was used for pain as well as Zofran Phenergan.  Despite all this, she is continued with symptoms including cramping pain and constipation.  Duloxetine was also started and she felt weird with it so she stopped it.  She had an MRI defecography in December 2021 which demonstrated incomplete evacuation with a third or more of the contrast gel left in her rectum despite strong effort, moderate to severe pelvic floor descent, moderate rectocele measuring 2.3 cm, and possible paradoxical contraction. Tried on Motegrity and having some bowel movements, but still continued with issues with abdominal pain and urgency.  She felt like she had lots of intestinal gas.  Advised at that time that she continue her bowel regimen with Motegrity MiraLAX and Benefiber, stop duloxetine, and referred her to pelvic floor physical therapy as well as urogynecology given her rectocele. Overall impression has been that of a very complex patient with ongoing pain symptoms that are chronic, out of proportion to objective findings, and have been refractory to many treatments. Advised that she see pain management for assistance with pain modulating medications and her overall chronic pain syndrome. She saw uro-gyn and was referred to PFPT. Have also referred her to dietician but she deferred. CT scan of the abdomen pelvis which was done in January 2022 which demonstrated a prior splenectomy and atrophic pancreatic tail and pelvic varices. 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 She also had evidence of median arcuate ligaments (MALS), per her report. She was having pain after meals. She continues with post-prandial pain, teary eyed during visit. This is chronic abdominal pain at this point, several years worth of this. She tried Linzess and it gave her diarrhea, and pain persisted so she stopped the medication. She had pelvic floor physicial therapy for 8 sessions.  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  Thus, since her last visit with me via phone I did suggest that she see vascular surgery for consideration of TALA stenting and also suggested that she get a CT angiogram to evaluate the celiac trunk and then consideration for MAL release.  She then saw Dr. Gottlieb in the office with request to do pancreatic elastase testing which was done in October and normal with a pancreatic elastase of 361. Also seems like since her last visit she was try to switch to another GI practice locally but now is back seeing me. I sent Linzess 72 daily to help with her symptoms. Still feel she is very hypervisceral, but will rule out vascular issues as a possible organic etiology to her symptoms. Don't feel she needs feeding tube given her weight is largely stable compared to last visit. She has also had little luck with anti-depressants/anti-anxiety agents in the past so will not revisit this at this point.  She continues to have burning epigastric pain, post-prandial discomfort. [place] and still having stomach pain, bloating all the time, hard for her to evacuate gas, stools. Difficulty with her morning BM. Has seen vascular surgery, vessels are all patent. She has small stools, fragmented, incomplete. Elastase 361. Still doing 1 cap of miralax twice daily, doing fiber gummies.  Feels gassy, incomplete evacuation. Takes dicyclomine QID. On Xanax BID.

## 2022-12-22 ENCOUNTER — TELEPHONE ENCOUNTER (OUTPATIENT)
Dept: URBAN - METROPOLITAN AREA CLINIC 7 | Facility: CLINIC | Age: 57
End: 2022-12-22

## 2023-01-05 ENCOUNTER — TELEPHONE ENCOUNTER (OUTPATIENT)
Dept: URBAN - METROPOLITAN AREA CLINIC 7 | Facility: CLINIC | Age: 58
End: 2023-01-05

## 2023-03-14 ENCOUNTER — OFFICE VISIT (OUTPATIENT)
Dept: URBAN - METROPOLITAN AREA CLINIC 7 | Facility: CLINIC | Age: 58
End: 2023-03-14
Payer: COMMERCIAL

## 2023-03-14 VITALS
RESPIRATION RATE: 16 BRPM | WEIGHT: 115 LBS | TEMPERATURE: 97.5 F | DIASTOLIC BLOOD PRESSURE: 80 MMHG | SYSTOLIC BLOOD PRESSURE: 140 MMHG | HEIGHT: 66 IN | BODY MASS INDEX: 18.48 KG/M2

## 2023-03-14 DIAGNOSIS — K63.89 SMALL INTESTINAL BACTERIAL OVERGROWTH (SIBO): ICD-10-CM

## 2023-03-14 DIAGNOSIS — R14.0 BLOATING: ICD-10-CM

## 2023-03-14 DIAGNOSIS — R93.5 ABNORMAL US (ULTRASOUND) OF ABDOMEN: ICD-10-CM

## 2023-03-14 DIAGNOSIS — R10.9 FUNCTIONAL ABDOMINAL PAIN SYNDROME: ICD-10-CM

## 2023-03-14 DIAGNOSIS — T81.89XS SUTURE GRANULOMA, SEQUELA: ICD-10-CM

## 2023-03-14 DIAGNOSIS — K59.00 CONSTIPATION, UNSPECIFIED CONSTIPATION TYPE: ICD-10-CM

## 2023-03-14 DIAGNOSIS — I77.4 MEDIAN ARCUATE LIGAMENT SYNDROME: ICD-10-CM

## 2023-03-14 DIAGNOSIS — R19.8 VISCERAL HYPERALGESIA: ICD-10-CM

## 2023-03-14 DIAGNOSIS — K58.1 IRRITABLE BOWEL SYNDROME WITH CONSTIPATION: ICD-10-CM

## 2023-03-14 DIAGNOSIS — M62.89 PELVIC FLOOR DYSFUNCTION IN FEMALE: ICD-10-CM

## 2023-03-14 PROCEDURE — 99215 OFFICE O/P EST HI 40 MIN: CPT | Performed by: INTERNAL MEDICINE

## 2023-03-14 RX ORDER — BUDESONIDE 3 MG/1
2 CAPSULES CAPSULE ORAL ONCE A DAY
Qty: 60 | Refills: 1 | Status: ON HOLD | COMMUNITY

## 2023-03-14 RX ORDER — ALPRAZOLAM 1 MG/1
TABLET ORAL
Qty: 30 TABLET | Status: ON HOLD | COMMUNITY

## 2023-03-14 RX ORDER — ESTRADIOL 0.1 MG/G
AS DIRECTED CREAM VAGINAL
Status: ACTIVE | COMMUNITY

## 2023-03-14 RX ORDER — BENAZEPRIL HYDROCHLORIDE 20 MG/1
1 TABLET TABLET ORAL ONCE A DAY
Status: ACTIVE | COMMUNITY

## 2023-03-14 RX ORDER — PLECANATIDE 3 MG/1
1 TABLET TABLET ORAL ONCE A DAY
Qty: 30 | Refills: 3 | Status: DISCONTINUED | COMMUNITY
Start: 2022-12-09 | End: 2023-04-08

## 2023-03-14 RX ORDER — AMITRIPTYLINE HYDROCHLORIDE 10 MG/1
1 (ONE) TABLET, FILM COATED ORAL AT BEDTIME
Qty: 0 | Refills: 5 | Status: ON HOLD | COMMUNITY
Start: 2022-01-07

## 2023-03-14 RX ORDER — ALPRAZOLAM 1 MG/1
1 TABLET TABLET ORAL TWICE A DAY
Status: ACTIVE | COMMUNITY

## 2023-03-14 RX ORDER — POLYETHYLENE GLYCOL 3350 17 G/17G
AS DIRECTED POWDER, FOR SOLUTION ORAL
Status: ACTIVE | COMMUNITY

## 2023-03-14 RX ORDER — BENAZEPRIL HYDROCHLORIDE 20 MG/1
TABLET, FILM COATED ORAL
Qty: 90 TABLET | Status: ON HOLD | COMMUNITY

## 2023-03-14 RX ORDER — DICYCLOMINE HYDROCHLORIDE 20 MG/1
TAKE 1 TABLET BY MOUTH THREE TIMES DAILY TABLET ORAL
Qty: 270 TABLET | Refills: 0 | COMMUNITY

## 2023-03-14 RX ORDER — ONDANSETRON 4 MG/1
1 (ONE) TABLET, ORALLY DISINTEGRATING ORAL
Qty: 0 | Refills: 5 | Status: ACTIVE | COMMUNITY
Start: 2022-06-27

## 2023-03-14 RX ORDER — BETAMETHASONE VALERATE 1 MG/G
APPLY TOPICALLY TO THE AFFECTED AREA TWICE DAILY AS NEEDED CREAM TOPICAL
Qty: 45 GRAM | Refills: 0 | Status: ON HOLD | COMMUNITY

## 2023-03-14 RX ORDER — DICYCLOMINE HYDROCHLORIDE 20 MG/1
1 TABLET TABLET ORAL THREE TIMES A DAY
Status: ACTIVE | COMMUNITY

## 2023-03-14 NOTE — HPI-TODAY'S VISIT:
LV 12/2022. 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  February 2019 EGD EUS with 2 cm hiatal hernia, Schatzki ring, antral gastropathy with H. pylori biopsies negative, and normal duodenum.  She also had an endoscopy in [place] in 2021 with biopsies once again negative for H. pylori and celiac disease.  Colonoscopy August 2021 demonstrated diverticulosis and otherwise normal colon and ileum with the exception of internal hemorrhoids.  EUS in August 2021 demonstrated normal duodenum, no pathology in the pancreatic head, no bile duct pathology, and no pancreatic duct pathology.  CT scan July 2021 with contrast demonstrated evidence of a distal pancreatectomy and splenectomy and was otherwise normal.  Very functional presentation, with emotional presentations over the last several years. Has been in tears in the office several times over the years stating her abdominal pain is overwhelming and she has demonstrated significant visceral hypersensitivity. She had a bad reaction to amitriptyline, failed Trulance, Linzess.  She was using benzos and alcohol to help her pain in the past.  Small intestinal bacterial overgrowth testing was negative.  Librax was used for pain as well as Zofran Phenergan.  Despite all this, she is continued with symptoms including cramping pain and constipation.  Duloxetine was also started and she felt weird with it so she stopped it.  She had an MRI defecography in December 2021 which demonstrated incomplete evacuation with a third or more of the contrast gel left in her rectum despite strong effort, moderate to severe pelvic floor descent, moderate rectocele measuring 2.3 cm, and possible paradoxical contraction. Tried on Motegrity and having some bowel movements, but still continued with issues with abdominal pain and urgency.  She felt like she had lots of intestinal gas.  Advised at that time that she continue her bowel regimen with Motegrity MiraLAX and Benefiber, stop duloxetine, and referred her to pelvic floor physical therapy as well as urogynecology given her rectocele. Overall impression has been that of a very complex patient with ongoing pain symptoms that are chronic, out of proportion to objective findings, and have been refractory to many treatments. Advised that she see pain management for assistance with pain modulating medications and her overall chronic pain syndrome. She saw uro-gyn and was referred to PFPT. Have also referred her to dietician but she deferred. CT scan of the abdomen pelvis which was done in January 2022 which demonstrated a prior splenectomy and atrophic pancreatic tail and pelvic varices. Had imaging done at [facility] in mid-2022 and had a mesenteric US, and she tells me that she have narrowing in the TALA and possible stenting was recommended for this. She also had evidence of median arcuate syndrome. Continued with chronic abdominal pain at this point, several years worth of this. She tried Linzess and it gave her diarrhea, and pain persisted so she stopped the medication. She had pelvic floor physicial therapy for 8 sessions. 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  Thus, since her last visit with me via phone I did suggest that she see vascular surgery for consideration of TALA stenting and also suggested that she get a CT angiogram to evaluate the celiac trunk and then consideration for MAL release.  She then saw Dr. Gottlieb in the office with request to do pancreatic elastase testing which was done in October and normal with a pancreatic elastase of 361. Also seems like since her last visit she was try to switch to another GI practice locally but now is back seeing me. I sent Linzess 72 daily to help with her symptoms. Still feel she is very hypervisceral, but will rule out vascular issues as a possible organic etiology to her symptoms. Don't feel she needs feeding tube given her weight is largely stable compared to last visit. She has also had little luck with anti-depressants/anti-anxiety agents in the past so will not revisit this at this point. She continued to have burning epigastric pain, post-prandial discomfort. [place], and still having stomach pain, bloating all the time, hard for her to evacuate gas, stools. Difficulty with her morning BM. Had seen vascular surgery, vessels are all patent. She has small stools, fragmented, incomplete. Elastase 361. Still doing 1 cap of miralax twice daily, doing fiber gummies.  Feels gassy, incomplete evacuation. Was taking dicyclomine QID. On Xanax BID. She continues to have incomplete evacuation, smaller stools, so constipated again. Was not on pancreatic enzymes anymore, although she was better initially on them. Do feel she has visceral hyperalgesia, pelvic floor dysfunction, IBS-C, and likely SIBO. She was to complete pelvic floor therapy in January which I agreed with, did treatment for SIBO with antimicrobials/antifungals, Librax as needed, and added Trulance back. Referred to surgery for suture granuloma and sought vascular opinion on MALS given mesenteric US result. Has seen surgery but no plans for surgical intervention. Seems all physicians involved in her care favor issues to be functional. Weight 109 lbs LV. I did also refer her to Dr. Madden from vascular for opinion on MALS. Also did advised CBD. FU now.  She did see a general surgeon at [place], and was told to take pancreatic enzymes, and to see Dr. Madden. He, however, reviewed her imaging and was told she does not have MALS. Did get CTA at . She did pelvic floor physical therapy, and anal dilators. Using Miralax daily, and doing well. Still bloating, distention, although bowels are better. She is better now than she has been in a long time.

## 2023-05-03 ENCOUNTER — TELEPHONE ENCOUNTER (OUTPATIENT)
Dept: URBAN - METROPOLITAN AREA CLINIC 7 | Facility: CLINIC | Age: 58
End: 2023-05-03

## 2023-05-03 RX ORDER — DICYCLOMINE HYDROCHLORIDE 20 MG/1
1 TABLET TABLET ORAL THREE TIMES A DAY
Status: ACTIVE | COMMUNITY

## 2023-05-03 RX ORDER — ONDANSETRON HYDROCHLORIDE 4 MG/1
1 TABLET TABLET, FILM COATED ORAL
Qty: 30 | Refills: 3 | OUTPATIENT
Start: 2023-05-03

## 2023-05-03 RX ORDER — ALPRAZOLAM 1 MG/1
1 TABLET TABLET ORAL TWICE A DAY
Status: ACTIVE | COMMUNITY

## 2023-05-03 RX ORDER — ESTRADIOL 0.1 MG/G
AS DIRECTED CREAM VAGINAL
Status: ACTIVE | COMMUNITY

## 2023-05-03 RX ORDER — BETAMETHASONE VALERATE 1 MG/G
APPLY TOPICALLY TO THE AFFECTED AREA TWICE DAILY AS NEEDED CREAM TOPICAL
Qty: 45 GRAM | Refills: 0 | Status: ON HOLD | COMMUNITY

## 2023-05-03 RX ORDER — ONDANSETRON 4 MG/1
1 (ONE) TABLET, ORALLY DISINTEGRATING ORAL
Qty: 0 | Refills: 5 | Status: ACTIVE | COMMUNITY
Start: 2022-06-27

## 2023-05-03 RX ORDER — BENAZEPRIL HYDROCHLORIDE 20 MG/1
TABLET, FILM COATED ORAL
Qty: 90 TABLET | Status: ON HOLD | COMMUNITY

## 2023-05-03 RX ORDER — BENAZEPRIL HYDROCHLORIDE 20 MG/1
1 TABLET TABLET ORAL ONCE A DAY
Status: ACTIVE | COMMUNITY

## 2023-05-03 RX ORDER — ALPRAZOLAM 1 MG/1
TABLET ORAL
Qty: 30 TABLET | Status: ON HOLD | COMMUNITY

## 2023-05-03 RX ORDER — DICYCLOMINE HYDROCHLORIDE 20 MG/1
TAKE 1 TABLET BY MOUTH THREE TIMES DAILY TABLET ORAL
Qty: 270 TABLET | Refills: 0 | COMMUNITY

## 2023-05-03 RX ORDER — BUDESONIDE 3 MG/1
2 CAPSULES CAPSULE ORAL ONCE A DAY
Qty: 60 | Refills: 1 | Status: ON HOLD | COMMUNITY

## 2023-05-03 RX ORDER — POLYETHYLENE GLYCOL 3350 17 G/17G
AS DIRECTED POWDER, FOR SOLUTION ORAL
Status: ACTIVE | COMMUNITY

## 2023-05-03 RX ORDER — AMITRIPTYLINE HYDROCHLORIDE 10 MG/1
1 (ONE) TABLET, FILM COATED ORAL AT BEDTIME
Qty: 0 | Refills: 5 | Status: ON HOLD | COMMUNITY
Start: 2022-01-07

## 2023-11-20 ENCOUNTER — ERX REFILL RESPONSE (OUTPATIENT)
Dept: URBAN - METROPOLITAN AREA CLINIC 7 | Facility: CLINIC | Age: 58
End: 2023-11-20

## 2023-11-20 RX ORDER — ONDANSETRON HYDROCHLORIDE 4 MG/1
1 TABLET TABLET, FILM COATED ORAL
Qty: 30 | Refills: 3 | OUTPATIENT

## 2024-05-05 ENCOUNTER — ERX REFILL RESPONSE (OUTPATIENT)
Dept: URBAN - METROPOLITAN AREA CLINIC 7 | Facility: CLINIC | Age: 59
End: 2024-05-05

## 2024-05-05 RX ORDER — ONDANSETRON HYDROCHLORIDE 4 MG/1
1 TABLET TABLET, FILM COATED ORAL
Qty: 60 | Refills: 5 | OUTPATIENT

## 2024-05-06 ENCOUNTER — TELEPHONE ENCOUNTER (OUTPATIENT)
Dept: URBAN - METROPOLITAN AREA CLINIC 7 | Facility: CLINIC | Age: 59
End: 2024-05-06

## 2024-05-06 ENCOUNTER — OFFICE VISIT (OUTPATIENT)
Dept: URBAN - METROPOLITAN AREA CLINIC 7 | Facility: CLINIC | Age: 59
End: 2024-05-06
Payer: COMMERCIAL

## 2024-05-06 ENCOUNTER — DASHBOARD ENCOUNTERS (OUTPATIENT)
Age: 59
End: 2024-05-06

## 2024-05-06 VITALS — HEIGHT: 66 IN | BODY MASS INDEX: 18.96 KG/M2 | RESPIRATION RATE: 16 BRPM | WEIGHT: 118 LBS

## 2024-05-06 DIAGNOSIS — M62.89 PELVIC FLOOR DYSFUNCTION IN FEMALE: ICD-10-CM

## 2024-05-06 DIAGNOSIS — K59.00 CONSTIPATION, UNSPECIFIED CONSTIPATION TYPE: ICD-10-CM

## 2024-05-06 DIAGNOSIS — R19.8 VISCERAL HYPERALGESIA: ICD-10-CM

## 2024-05-06 DIAGNOSIS — R14.0 BLOATING: ICD-10-CM

## 2024-05-06 DIAGNOSIS — T81.89XS SUTURE GRANULOMA, SEQUELA: ICD-10-CM

## 2024-05-06 DIAGNOSIS — R93.5 ABNORMAL US (ULTRASOUND) OF ABDOMEN: ICD-10-CM

## 2024-05-06 DIAGNOSIS — K63.89 SMALL INTESTINAL BACTERIAL OVERGROWTH (SIBO): ICD-10-CM

## 2024-05-06 DIAGNOSIS — K58.1 IRRITABLE BOWEL SYNDROME WITH CONSTIPATION: ICD-10-CM

## 2024-05-06 DIAGNOSIS — R10.9 FUNCTIONAL ABDOMINAL PAIN SYNDROME: ICD-10-CM

## 2024-05-06 PROCEDURE — 99443 PHONE E/M BY PHYS 21-30 MIN: CPT | Performed by: INTERNAL MEDICINE

## 2024-05-06 RX ORDER — TENAPANOR HYDROCHLORIDE 53.2 MG/1
1 TABLET IMMEDIATELY BEFORE MEALS TABLET ORAL ONCE DAILY
Qty: 30 | Refills: 3 | OUTPATIENT
Start: 2024-05-06 | End: 2024-09-03

## 2024-05-06 RX ORDER — AMITRIPTYLINE HYDROCHLORIDE 10 MG/1
1 (ONE) TABLET, FILM COATED ORAL AT BEDTIME
Qty: 0 | Refills: 5 | Status: DISCONTINUED | COMMUNITY
Start: 2022-01-07

## 2024-05-06 RX ORDER — TENAPANOR HYDROCHLORIDE 53.2 MG/1
1 TABLET IMMEDIATELY BEFORE MEALS TABLET ORAL ONCE DAILY
Qty: 30 | Refills: 3
Start: 2024-05-06 | End: 2024-09-04

## 2024-05-06 RX ORDER — BETAMETHASONE VALERATE 1 MG/G
APPLY TOPICALLY TO THE AFFECTED AREA TWICE DAILY AS NEEDED CREAM TOPICAL
Qty: 45 GRAM | Refills: 0 | Status: ACTIVE | COMMUNITY

## 2024-05-06 RX ORDER — BUDESONIDE 3 MG/1
2 CAPSULES CAPSULE ORAL ONCE A DAY
Qty: 60 | Refills: 1 | Status: DISCONTINUED | COMMUNITY

## 2024-05-06 RX ORDER — BENAZEPRIL HYDROCHLORIDE 20 MG/1
1 TABLET TABLET ORAL ONCE A DAY
Status: ACTIVE | COMMUNITY

## 2024-05-06 RX ORDER — ALPRAZOLAM 1 MG/1
TABLET ORAL
Qty: 30 TABLET | Status: ACTIVE | COMMUNITY

## 2024-05-06 RX ORDER — POLYETHYLENE GLYCOL 3350 17 G/17G
AS DIRECTED POWDER, FOR SOLUTION ORAL
Status: DISCONTINUED | COMMUNITY

## 2024-05-06 RX ORDER — ALPRAZOLAM 1 MG/1
1 TABLET TABLET ORAL TWICE A DAY
Status: ACTIVE | COMMUNITY

## 2024-05-06 RX ORDER — BENAZEPRIL HYDROCHLORIDE 20 MG/1
TABLET, FILM COATED ORAL
Qty: 90 TABLET | Status: ACTIVE | COMMUNITY

## 2024-05-06 RX ORDER — ESTRADIOL 0.1 MG/G
AS DIRECTED CREAM VAGINAL
Status: DISCONTINUED | COMMUNITY

## 2024-05-06 RX ORDER — ONDANSETRON 4 MG/1
1 (ONE) TABLET, ORALLY DISINTEGRATING ORAL
Qty: 0 | Refills: 5 | Status: ACTIVE | COMMUNITY
Start: 2022-06-27

## 2024-05-06 RX ORDER — DICYCLOMINE HYDROCHLORIDE 20 MG/1
TAKE 1 TABLET BY MOUTH THREE TIMES DAILY TABLET ORAL
Qty: 270 TABLET | Refills: 3 | Status: ACTIVE | COMMUNITY

## 2024-09-08 ENCOUNTER — ERX REFILL RESPONSE (OUTPATIENT)
Dept: URBAN - METROPOLITAN AREA CLINIC 7 | Facility: CLINIC | Age: 59
End: 2024-09-08

## 2024-09-08 RX ORDER — ONDANSETRON 4 MG/1
1 TABLET ON THE TONGUE AND ALLOW TO DISSOLVE TABLET, ORALLY DISINTEGRATING ORAL
Qty: 30 TABLET | Refills: 3 | OUTPATIENT

## 2024-09-08 RX ORDER — ONDANSETRON 4 MG/1
1 (ONE) TABLET, ORALLY DISINTEGRATING ORAL
Qty: 0 | Refills: 5 | OUTPATIENT

## 2024-10-05 ENCOUNTER — RX ONLY (RX ONLY)
Age: 59
End: 2024-10-05

## 2024-11-12 ENCOUNTER — ERX REFILL RESPONSE (OUTPATIENT)
Dept: URBAN - METROPOLITAN AREA CLINIC 7 | Facility: CLINIC | Age: 59
End: 2024-11-12

## 2024-11-12 RX ORDER — ONDANSETRON 4 MG/1
1 TABLET ON THE TONGUE AND ALLOW TO DISSOLVE TABLET, ORALLY DISINTEGRATING ORAL
Qty: 30 TABLET | Refills: 3 | OUTPATIENT

## 2024-11-26 ENCOUNTER — TELEPHONE ENCOUNTER (OUTPATIENT)
Dept: URBAN - METROPOLITAN AREA CLINIC 7 | Facility: CLINIC | Age: 59
End: 2024-11-26

## 2025-02-06 ENCOUNTER — APPOINTMENT (OUTPATIENT)
Dept: URBAN - METROPOLITAN AREA CLINIC 120 | Facility: CLINIC | Age: 60
Setting detail: DERMATOLOGY
End: 2025-02-06

## 2025-02-06 DIAGNOSIS — L40.0 PSORIASIS VULGARIS: ICD-10-CM

## 2025-02-06 DIAGNOSIS — L20.89 OTHER ATOPIC DERMATITIS: ICD-10-CM

## 2025-02-06 DIAGNOSIS — L24 IRRITANT CONTACT DERMATITIS: ICD-10-CM

## 2025-02-06 DIAGNOSIS — Z71.89 OTHER SPECIFIED COUNSELING: ICD-10-CM

## 2025-02-06 PROBLEM — L24.9 IRRITANT CONTACT DERMATITIS, UNSPECIFIED CAUSE: Status: ACTIVE | Noted: 2025-02-06

## 2025-02-06 PROCEDURE — ? PRESCRIPTION

## 2025-02-06 PROCEDURE — ? MEDICATION COUNSELING

## 2025-02-06 PROCEDURE — 99203 OFFICE O/P NEW LOW 30 MIN: CPT

## 2025-02-06 PROCEDURE — ? PRESCRIPTION MEDICATION MANAGEMENT

## 2025-02-06 PROCEDURE — ? COUNSELING

## 2025-02-06 RX ORDER — KETOCONAZOLE 20 MG/ML
1 SHAMPOO, SUSPENSION TOPICAL AS DIRECTED
Qty: 120 | Refills: 3 | Status: ERX | COMMUNITY
Start: 2025-02-06

## 2025-02-06 RX ORDER — CLOBETASOL PROPIONATE 0.5 MG/ML
1 SOLUTION TOPICAL QHS
Qty: 50 | Refills: 3 | Status: ERX | COMMUNITY
Start: 2025-02-06

## 2025-02-06 RX ORDER — CLOBETASOL PROPIONATE 0.5 MG/G
1 OINTMENT TOPICAL
Qty: 45 | Refills: 2 | Status: ERX | COMMUNITY
Start: 2025-02-06

## 2025-02-06 RX ORDER — TRIAMCINOLONE ACETONIDE 1 MG/G
1 CREAM TOPICAL BID
Qty: 80 | Refills: 3 | Status: ERX | COMMUNITY
Start: 2025-02-06

## 2025-02-06 RX ADMIN — CLOBETASOL PROPIONATE 1: 0.5 OINTMENT TOPICAL at 00:00

## 2025-02-06 RX ADMIN — CLOBETASOL PROPIONATE 1: 0.5 SOLUTION TOPICAL at 00:00

## 2025-02-06 RX ADMIN — KETOCONAZOLE 1: 20 SHAMPOO, SUSPENSION TOPICAL at 00:00

## 2025-02-06 RX ADMIN — TRIAMCINOLONE ACETONIDE 1: 1 CREAM TOPICAL at 00:00

## 2025-02-06 ASSESSMENT — LOCATION SIMPLE DESCRIPTION DERM
LOCATION SIMPLE: RIGHT HAND
LOCATION SIMPLE: LEFT HAND
LOCATION SIMPLE: ABDOMEN
LOCATION SIMPLE: FRONTAL SCALP

## 2025-02-06 ASSESSMENT — LOCATION ZONE DERM
LOCATION ZONE: TRUNK
LOCATION ZONE: HAND
LOCATION ZONE: SCALP

## 2025-02-06 ASSESSMENT — LOCATION DETAILED DESCRIPTION DERM
LOCATION DETAILED: MEDIAL FRONTAL SCALP
LOCATION DETAILED: LEFT RADIAL DORSAL HAND
LOCATION DETAILED: PERIUMBILICAL SKIN
LOCATION DETAILED: RIGHT RADIAL DORSAL HAND

## 2025-02-06 NOTE — PROCEDURE: PRESCRIPTION MEDICATION MANAGEMENT
Render In Strict Bullet Format?: No
Initiate Treatment: Clobetasol ointment
Detail Level: Zone
Initiate Treatment: Ketoconazole shampoo and Clobetasol scalp solution.
Initiate Treatment: Triamcinolone cream

## 2025-02-06 NOTE — PROCEDURE: MEDICATION COUNSELING
Opioid Pregnancy And Lactation Text: These medications can lead to premature delivery and should be avoided during pregnancy. These medications are also present in breast milk in small amounts.
5-Fu Pregnancy And Lactation Text: This medication is Pregnancy Category X and contraindicated in pregnancy and in women who may become pregnant. It is unknown if this medication is excreted in breast milk.
Gabapentin Counseling: I discussed with the patient the risks of gabapentin including but not limited to dizziness, somnolence, fatigue and ataxia.
Ilumya Counseling: I discussed with the patient the risks of tildrakizumab including but not limited to immunosuppression, malignancy, posterior leukoencephalopathy syndrome, and serious infections.  The patient understands that monitoring is required including a PPD at baseline and must alert us or the primary physician if symptoms of infection or other concerning signs are noted.
Opzelura Pregnancy And Lactation Text: There is insufficient data to evaluate drug-associated risk for major birth defects, miscarriage, or other adverse maternal or fetal outcomes.  There is a pregnancy registry that monitors pregnancy outcomes in pregnant persons exposed to the medication during pregnancy.  It is unknown if this medication is excreted in breast milk.  Do not breastfeed during treatment and for about 4 weeks after the last dose.
Adbry Counseling: I discussed with the patient the risks of tralokinumab including but not limited to eye infection and irritation, cold sores, injection site reactions, worsening of asthma, allergic reactions and increased risk of parasitic infection.  Live vaccines should be avoided while taking tralokinumab. The patient understands that monitoring is required and they must alert us or the primary physician if symptoms of infection or other concerning signs are noted.
Oxybutynin Counseling:  I discussed with the patient the risks of oxybutynin including but not limited to skin rash, drowsiness, dry mouth, difficulty urinating, and blurred vision.
Sarecycline Pregnancy And Lactation Text: This medication is Pregnancy Category D and not consider safe during pregnancy. It is also excreted in breast milk.
Topical Clindamycin Pregnancy And Lactation Text: This medication is Pregnancy Category B and is considered safe during pregnancy. It is unknown if it is excreted in breast milk.
Stelara Pregnancy And Lactation Text: This medication is Pregnancy Category B and is considered safe during pregnancy. It is unknown if this medication is excreted in breast milk.
Ilumya Pregnancy And Lactation Text: The risk during pregnancy and breastfeeding is uncertain with this medication.
Bactrim Pregnancy And Lactation Text: This medication is Pregnancy Category D and is known to cause fetal risk.  It is also excreted in breast milk.
Topical Ketoconazole Counseling: Patient counseled that this medication may cause skin irritation or allergic reactions.  In the event of skin irritation, the patient was advised to reduce the amount of the drug applied or use it less frequently.   The patient verbalized understanding of the proper use and possible adverse effects of ketoconazole.  All of the patient's questions and concerns were addressed.
Gabapentin Pregnancy And Lactation Text: This medication is Pregnancy Category C and isn't considered safe during pregnancy. It is excreted in breast milk.
Tetracycline Counseling: Patient counseled regarding possible photosensitivity and increased risk for sunburn.  Patient instructed to avoid sunlight, if possible.  When exposed to sunlight, patients should wear protective clothing, sunglasses, and sunscreen.  The patient was instructed to call the office immediately if the following severe adverse effects occur:  hearing changes, easy bruising/bleeding, severe headache, or vision changes.  The patient verbalized understanding of the proper use and possible adverse effects of tetracycline.  All of the patient's questions and concerns were addressed. Patient understands to avoid pregnancy while on therapy due to potential birth defects.
Drysol Counseling:  I discussed with the patient the risks of drysol/aluminum chloride including but not limited to skin rash, itching, irritation, burning.
Cephalexin Counseling: I counseled the patient regarding use of cephalexin as an antibiotic for prophylactic and/or therapeutic purposes. Cephalexin (commonly prescribed under brand name Keflex) is a cephalosporin antibiotic which is active against numerous classes of bacteria, including most skin bacteria. Side effects may include nausea, diarrhea, gastrointestinal upset, rash, hives, yeast infections, and in rare cases, hepatitis, kidney disease, seizures, fever, confusion, neurologic symptoms, and others. Patients with severe allergies to penicillin medications are cautioned that there is about a 10% incidence of cross-reactivity with cephalosporins. When possible, patients with penicillin allergies should use alternatives to cephalosporins for antibiotic therapy.
Picato Counseling:  I discussed with the patient the risks of Picato including but not limited to erythema, scaling, itching, weeping, crusting, and pain.
Adbry Pregnancy And Lactation Text: It is unknown if this medication will adversely affect pregnancy or breast feeding.
Infliximab Counseling:  I discussed with the patient the risks of infliximab including but not limited to myelosuppression, immunosuppression, autoimmune hepatitis, demyelinating diseases, lymphoma, and serious infections.  The patient understands that monitoring is required including a PPD at baseline and must alert us or the primary physician if symptoms of infection or other concerning signs are noted.
Drysol Pregnancy And Lactation Text: This medication is considered safe during pregnancy and breast feeding.
Picato Pregnancy And Lactation Text: This medication is Pregnancy Category C. It is unknown if this medication is excreted in breast milk.
Propranolol Counseling:  I discussed with the patient the risks of propranolol including but not limited to low heart rate, low blood pressure, low blood sugar, restlessness and increased cold sensitivity. They should call the office if they experience any of these side effects.
Taltz Counseling: I discussed with the patient the risks of ixekizumab including but not limited to immunosuppression, serious infections, worsening of inflammatory bowel disease and drug reactions.  The patient understands that monitoring is required including a PPD at baseline and must alert us or the primary physician if symptoms of infection or other concerning signs are noted.
Ozempic Counseling: I reviewed the possible side effects including: thyroid tumors, kidney disease, gallbladder disease, abdominal pain, constipation, diarrhea, nausea, vomiting and pancreatitis. Do not take this medication if you have a history or family history of multiple endocrine neoplasia syndrome type 2. Side effects reviewed, pt to contact office should one occur.
Cephalexin Pregnancy And Lactation Text: This medication is Pregnancy Category B and considered safe during pregnancy.  It is also excreted in breast milk but can be used safely for shorter doses.
Bimzelx Counseling:  I discussed with the patient the risks of Bimzelx including but not limited to depression, immunosuppression, allergic reactions and infections.  The patient understands that monitoring is required including a PPD at baseline and must alert us or the primary physician if symptoms of infection or other concerning signs are noted.
Acitretin Counseling:  I discussed with the patient the risks of acitretin including but not limited to hair loss, dry lips/skin/eyes, liver damage, hyperlipidemia, depression/suicidal ideation, photosensitivity.  Serious rare side effects can include but are not limited to pancreatitis, pseudotumor cerebri, bony changes, clot formation/stroke/heart attack.  Patient understands that alcohol is contraindicated since it can result in liver toxicity and significantly prolong the elimination of the drug by many years.
Glycopyrrolate Counseling:  I discussed with the patient the risks of glycopyrrolate including but not limited to skin rash, drowsiness, dry mouth, difficulty urinating, and blurred vision.
Protopic Counseling: Patient may experience a mild burning sensation during topical application. Protopic is not approved in children less than 2 years of age. There have been case reports of hematologic and skin malignancies in patients using topical calcineurin inhibitors although causality is questionable.
Bimzelx Pregnancy And Lactation Text: This medication crosses the placenta and the safety is uncertain during pregnancy. It is unknown if this medication is present in breast milk.
Oxempic Pregnancy And Lactation Text: The fetal risk of this medication is unknown and taking while pregnant is not recommended. It is unknown if this medication is present in breast milk.
Elidel Counseling: Patient may experience a mild burning sensation during topical application. Elidel is not approved in children less than 2 years of age. There have been case reports of hematologic and skin malignancies in patients using topical calcineurin inhibitors although causality is questionable.
Dutasteride Male Counseling: Dutasteride Counseling:  I discussed with the patient the risks of use of dutasteride including but not limited to decreased libido, decreased ejaculate volume, and gynecomastia. Women who can become pregnant should not handle medication.  All of the patient's questions and concerns were addressed.
Propranolol Pregnancy And Lactation Text: This medication is Pregnancy Category C and it isn't known if it is safe during pregnancy. It is excreted in breast milk.
Topical Metronidazole Counseling: Metronidazole is a topical antibiotic medication. You may experience burning, stinging, redness, or allergic reactions.  Please call our office if you develop any problems from using this medication.
Tremfya Counseling: I discussed with the patient the risks of guselkumab including but not limited to immunosuppression, serious infections, and drug reactions.  The patient understands that monitoring is required including a PPD at baseline and must alert us or the primary physician if symptoms of infection or other concerning signs are noted.
Acitretin Pregnancy And Lactation Text: This medication is Pregnancy Category X and should not be given to women who are pregnant or may become pregnant in the future. This medication is excreted in breast milk.
Glycopyrrolate Pregnancy And Lactation Text: This medication is Pregnancy Category B and is considered safe during pregnancy. It is unknown if it is excreted breast milk.
Clindamycin Counseling: I counseled the patient regarding use of clindamycin as an antibiotic for prophylactic and/or therapeutic purposes. Clindamycin is active against numerous classes of bacteria, including skin bacteria. Side effects may include nausea, diarrhea, gastrointestinal upset, rash, hives, yeast infections, and in rare cases, colitis.
Saxenda Counseling: I reviewed the possible side effects including: thyroid tumors, kidney disease, gallbladder disease, abdominal pain, constipation, diarrhea, nausea, vomiting and pancreatitis. Do not take this medication if you have a history or family history of multiple endocrine neoplasia syndrome type 2. Side effects reviewed, pt to contact office should one occur.
Zoryve Counseling:  I discussed with the patient that Zoryve is not for use in the eyes, mouth or vagina. The most commonly reported side effects include diarrhea, headache, insomnia, application site pain, upper respiratory tract infections, and urinary tract infections.  All of the patient's questions and concerns were addressed.
Protopic Pregnancy And Lactation Text: This medication is Pregnancy Category C. It is unknown if this medication is excreted in breast milk when applied topically.
Bexarotene Counseling:  I discussed with the patient the risks of bexarotene including but not limited to hair loss, dry lips/skin/eyes, liver abnormalities, hyperlipidemia, pancreatitis, depression/suicidal ideation, photosensitivity, drug rash/allergic reactions, hypothyroidism, anemia, leukopenia, infection, cataracts, and teratogenicity.  Patient understands that they will need regular blood tests to check lipid profile, liver function tests, white blood cell count, thyroid function tests and pregnancy test if applicable.
Hydroxychloroquine Counseling:  I discussed with the patient that a baseline ophthalmologic exam is needed at the start of therapy and every year thereafter while on therapy. A CBC may also be warranted for monitoring.  The side effects of this medication were discussed with the patient, including but not limited to agranulocytosis, aplastic anemia, seizures, rashes, retinopathy, and liver toxicity. Patient instructed to call the office should any adverse effect occur.  The patient verbalized understanding of the proper use and possible adverse effects of Plaquenil.  All the patient's questions and concerns were addressed.
Nemluvio Counseling: I discussed with the patient the risks of nemolizumab including but not limited to headache, gastrointestinal complaints, nasopharyngitis, musculoskeletal complaints, injection site reactions, and allergic reactions. The patient understands that monitoring is required and they must alert us or the primary physician if any side effects are noted.
SSKI Counseling:  I discussed with the patient the risks of SSKI including but not limited to thyroid abnormalities, metallic taste, GI upset, fever, headache, acne, arthralgias, paraesthesias, lymphadenopathy, easy bleeding, arrhythmias, and allergic reaction.
Cimzia Counseling:  I discussed with the patient the risks of Cimzia including but not limited to immunosuppression, allergic reactions and infections.  The patient understands that monitoring is required including a PPD at baseline and must alert us or the primary physician if symptoms of infection or other concerning signs are noted.
Topical Metronidazole Pregnancy And Lactation Text: This medication is Pregnancy Category B and considered safe during pregnancy.  It is also considered safe to use while breastfeeding.
Dutasteride Female Counseling: Dutasteride Counseling:  I discussed with the patient the risks of use of dutasteride including but not limited to decreased libido and sexual dysfunction. Explained the teratogenic nature of the medication and stressed the importance of not getting pregnant during treatment. All of the patient's questions and concerns were addressed.
Rinvoq Counseling: I discussed with the patient the risks of Rinvoq therapy including but not limited to upper respiratory tract infections, shingles, cold sores, bronchitis, nausea, cough, fever, acne, and headache. Live vaccines should be avoided.  This medication has been linked to serious infections; higher rate of mortality; malignancy and lymphoproliferative disorders; major adverse cardiovascular events; thrombosis; thrombocytopenia, anemia, and neutropenia; lipid elevations; liver enzyme elevations; and gastrointestinal perforations.
Vtama Pregnancy And Lactation Text: It is unknown if this medication can cause problems during pregnancy and breastfeeding.
Skyrizi Counseling: I discussed with the patient the risks of risankizumab-rzaa including but not limited to immunosuppression, and serious infections.  The patient understands that monitoring is required including a PPD at baseline and must alert us or the primary physician if symptoms of infection or other concerning signs are noted.
Terbinafine Counseling: Patient counseling regarding adverse effects of terbinafine including but not limited to headache, diarrhea, rash, upset stomach, liver function test abnormalities, itching, taste/smell disturbance, nausea, abdominal pain, and flatulence.  There is a rare possibility of liver failure that can occur when taking terbinafine.  The patient understands that a baseline LFT and kidney function test may be required. The patient verbalized understanding of the proper use and possible adverse effects of terbinafine.  All of the patient's questions and concerns were addressed.
Quinolones Pregnancy And Lactation Text: This medication is Pregnancy Category C and it isn't know if it is safe during pregnancy. It is also excreted in breast milk.
Oral Minoxidil Counseling- I discussed with the patient the risks of oral minoxidil including but not limited to shortness of breath, swelling of the feet or ankles, dizziness, lightheadedness, unwanted hair growth and allergic reaction.  The patient verbalized understanding of the proper use and possible adverse effects of oral minoxidil.  All of the patient's questions and concerns were addressed.
Humira Counseling:  I discussed with the patient the risks of adalimumab including but not limited to myelosuppression, immunosuppression, autoimmune hepatitis, demyelinating diseases, lymphoma, and serious infections.  The patient understands that monitoring is required including a PPD at baseline and must alert us or the primary physician if symptoms of infection or other concerning signs are noted.
Calcipotriene Counseling:  I discussed with the patient the risks of calcipotriene including but not limited to erythema, scaling, itching, and irritation.
Prednisone Counseling:  I discussed with the patient the risks of prolonged use of prednisone including but not limited to weight gain, insomnia, osteoporosis, mood changes, diabetes, susceptibility to infection, glaucoma and high blood pressure.  In cases where prednisone use is prolonged, patients should be monitored with blood pressure checks, serum glucose levels and an eye exam.  Additionally, the patient may need to be placed on GI prophylaxis, PCP prophylaxis, and calcium and vitamin D supplementation and/or a bisphosphonate.  The patient verbalized understanding of the proper use and the possible adverse effects of prednisone.  All of the patient's questions and concerns were addressed.
Minoxidil Pregnancy And Lactation Text: This medication has not been assigned a Pregnancy Risk Category but animal studies failed to show danger with the topical medication. It is unknown if the medication is excreted in breast milk.
Rinvoq Pregnancy And Lactation Text: Based on animal studies, Rinvoq may cause embryo-fetal harm when administered to pregnant women.  The medication should not be used in pregnancy.  Breastfeeding is not recommended during treatment and for 6 days after the last dose.
Calcipotriene Pregnancy And Lactation Text: The use of this medication during pregnancy or lactation is not recommended as there is insufficient data.
Colchicine Counseling:  Patient counseled regarding adverse effects including but not limited to stomach upset (nausea, vomiting, stomach pain, or diarrhea).  Patient instructed to limit alcohol consumption while taking this medication.  Colchicine may reduce blood counts especially with prolonged use.  The patient understands that monitoring of kidney function and blood counts may be required, especially at baseline. The patient verbalized understanding of the proper use and possible adverse effects of colchicine.  All of the patient's questions and concerns were addressed.
Terbinafine Pregnancy And Lactation Text: This medication is Pregnancy Category B and is considered safe during pregnancy. It is also excreted in breast milk and breast feeding isn't recommended.
Prednisone Pregnancy And Lactation Text: This medication is Pregnancy Category C and it isn't know if it is safe during pregnancy. This medication is excreted in breast milk.
Oral Minoxidil Pregnancy And Lactation Text: This medication should only be used when clearly needed if you are pregnant, attempting to become pregnant or breast feeding.
Rifampin Counseling: I discussed with the patient the risks of rifampin including but not limited to liver damage, kidney damage, red-orange body fluids, nausea/vomiting and severe allergy.
Xeljanz Counseling: I discussed with the patient the risks of Xeljanz therapy including increased risk of infection, liver issues, headache, diarrhea, or cold symptoms. Live vaccines should be avoided. They were instructed to call if they have any problems.
Tazorac Counseling:  Patient advised that medication is irritating and drying.  Patient may need to apply sparingly and wash off after an hour before eventually leaving it on overnight.  The patient verbalized understanding of the proper use and possible adverse effects of tazorac.  All of the patient's questions and concerns were addressed.
Azithromycin Counseling:  I discussed with the patient the risks of azithromycin including but not limited to GI upset, allergic reaction, drug rash, diarrhea, and yeast infections.
Mirvaso Counseling: Mirvaso is a topical medication which can decrease superficial blood flow where applied. Side effects are uncommon and include stinging, redness and allergic reactions.
Tazorac Pregnancy And Lactation Text: This medication is not safe during pregnancy. It is unknown if this medication is excreted in breast milk.
Hyrimoz Counseling:  I discussed with the patient the risks of adalimumab including but not limited to myelosuppression, immunosuppression, autoimmune hepatitis, demyelinating diseases, lymphoma, and serious infections.  The patient understands that monitoring is required including a PPD at baseline and must alert us or the primary physician if symptoms of infection or other concerning signs are noted.
Cantharidin Counseling:  I discussed with the patient the risks of Cantharidin including but not limited to pain, redness, burning, itching, and blistering.
Mirvaso Pregnancy And Lactation Text: This medication has not been assigned a Pregnancy Risk Category. It is unknown if the medication is excreted in breast milk.
Otezla Counseling: The side effects of Otezla were discussed with the patient, including but not limited to worsening or new depression, weight loss, diarrhea, nausea, upper respiratory tract infection, and headache. Patient instructed to call the office should any adverse effect occur.  The patient verbalized understanding of the proper use and possible adverse effects of Otezla.  All the patient's questions and concerns were addressed.
Spevigo Counseling: I discussed with the patient the risks of Spevigo including but not limited to fatigue, nasuea, vomiting, headache, pruritus, urinary tract infection, an infusion related reactions.  The patient understands that monitoring is required including screening for tuberculosis at baseline and yearly screening thereafter while continuing Spevigo therapy. They should contact us if symptoms of infection or other concerning signs are noted.
Rifampin Pregnancy And Lactation Text: This medication is Pregnancy Category C and it isn't know if it is safe during pregnancy. It is also excreted in breast milk and should not be used if you are breast feeding.
Azithromycin Pregnancy And Lactation Text: This medication is considered safe during pregnancy and is also secreted in breast milk.
Spevigo Pregnancy And Lactation Text: The risk during pregnancy and breastfeeding is uncertain with this medication. This medication does cross the placenta. It is unknown if this medication is found in breast milk.
Xelkarenz Pregnancy And Lactation Text: This medication is Pregnancy Category D and is not considered safe during pregnancy.  The risk during breast feeding is also uncertain.
Dapsone Counseling: I discussed with the patient the risks of dapsone including but not limited to hemolytic anemia, agranulocytosis, rashes, methemoglobinemia, kidney failure, peripheral neuropathy, headaches, GI upset, and liver toxicity.  Patients who start dapsone require monitoring including baseline LFTs and weekly CBCs for the first month, then every month thereafter.  The patient verbalized understanding of the proper use and possible adverse effects of dapsone.  All of the patient's questions and concerns were addressed.
Opzelura Counseling:  I discussed with the patient the risks of Opzelura including but not limited to nasopharngitis, bronchitis, ear infection, eosinophila, hives, diarrhea, folliculitis, tonsillitis, and rhinorrhea.  Taken orally, this medication has been linked to serious infections; higher rate of mortality; malignancy and lymphoproliferative disorders; major adverse cardiovascular events; thrombosis; thrombocytopenia, anemia, and neutropenia; and lipid elevations.
Opioid Counseling: I discussed with the patient the potential side effects of opioids including but not limited to addiction, altered mental status, and depression. I stressed avoiding alcohol, benzodiazepines, muscle relaxants and sleep aids unless specifically okayed by a physician. The patient verbalized understanding of the proper use and possible adverse effects of opioids. All of the patient's questions and concerns were addressed. They were instructed to flush the remaining pills down the toilet if they did not need them for pain.
Topical Clindamycin Counseling: Patient counseled that this medication may cause skin irritation or allergic reactions.  In the event of skin irritation, the patient was advised to reduce the amount of the drug applied or use it less frequently.   The patient verbalized understanding of the proper use and possible adverse effects of clindamycin.  All of the patient's questions and concerns were addressed.
Sarecycline Counseling: Patient advised regarding possible photosensitivity and discoloration of the teeth, skin, lips, tongue and gums.  Patient instructed to avoid sunlight, if possible.  When exposed to sunlight, patients should wear protective clothing, sunglasses, and sunscreen.  The patient was instructed to call the office immediately if the following severe adverse effects occur:  hearing changes, easy bruising/bleeding, severe headache, or vision changes.  The patient verbalized understanding of the proper use and possible adverse effects of sarecycline.  All of the patient's questions and concerns were addressed.
5-Fu Counseling: 5-Fluorouracil Counseling:  I discussed with the patient the risks of 5-fluorouracil including but not limited to erythema, scaling, itching, weeping, crusting, and pain.
Otezla Pregnancy And Lactation Text: This medication is Pregnancy Category C and it isn't known if it is safe during pregnancy. It is unknown if it is excreted in breast milk.
Stelara Counseling:  I discussed with the patient the risks of ustekinumab including but not limited to immunosuppression, malignancy, posterior leukoencephalopathy syndrome, and serious infections.  The patient understands that monitoring is required including a PPD at baseline and must alert us or the primary physician if symptoms of infection or other concerning signs are noted.
Dapsone Pregnancy And Lactation Text: This medication is Pregnancy Category C and is not considered safe during pregnancy or breast feeding.
Bactrim Counseling:  I discussed with the patient the risks of sulfa antibiotics including but not limited to GI upset, allergic reaction, drug rash, diarrhea, dizziness, photosensitivity, and yeast infections.  Rarely, more serious reactions can occur including but not limited to aplastic anemia, agranulocytosis, methemoglobinemia, blood dyscrasias, liver or kidney failure, lung infiltrates or desquamative/blistering drug rashes.
Doxepin Counseling:  Patient advised that the medication is sedating and not to drive a car after taking this medication. Patient informed of potential adverse effects including but not limited to dry mouth, urinary retention, and blurry vision.  The patient verbalized understanding of the proper use and possible adverse effects of doxepin.  All of the patient's questions and concerns were addressed.
Tranexamic Acid Pregnancy And Lactation Text: It is unknown if this medication is safe during pregnancy or breast feeding.
Birth Control Pills Counseling: Birth Control Pill Counseling: I discussed with the patient the potential side effects of OCPs including but not limited to increased risk of stroke, heart attack, thrombophlebitis, deep venous thrombosis, hepatic adenomas, breast changes, GI upset, headaches, and depression.  The patient verbalized understanding of the proper use and possible adverse effects of OCPs. All of the patient's questions and concerns were addressed.
Sotyktu Counseling:  I discussed the most common side effects of Sotyktu including: common cold, sore throat, sinus infections, cold sores, canker sores, folliculitis, and acne.  I also discussed more serious side effects of Sotyktu including but not limited to: serious allergic reactions; increased risk for infections such as TB; cancers such as lymphomas; rhabdomyolysis and elevated CPK; and elevated triglycerides and liver enzymes. 
Wartpeel Counseling:  I discussed with the patient the risks of Wartpeel including but not limited to erythema, scaling, itching, weeping, crusting, and pain.
Imiquimod Counseling:  I discussed with the patient the risks of imiquimod including but not limited to erythema, scaling, itching, weeping, crusting, and pain.  Patient understands that the inflammatory response to imiquimod is variable from person to person and was educated regarded proper titration schedule.  If flu-like symptoms develop, patient knows to discontinue the medication and contact us.
Cyclophosphamide Pregnancy And Lactation Text: This medication is Pregnancy Category D and it isn't considered safe during pregnancy. This medication is excreted in breast milk.
Metronidazole Counseling:  I discussed with the patient the risks of metronidazole including but not limited to seizures, nausea/vomiting, a metallic taste in the mouth, nausea/vomiting and severe allergy.
Solaraze Counseling:  I discussed with the patient the risks of Solaraze including but not limited to erythema, scaling, itching, weeping, crusting, and pain.
High Dose Vitamin A Pregnancy And Lactation Text: High dose vitamin A therapy is contraindicated during pregnancy and breast feeding.
Niacinamide Pregnancy And Lactation Text: These medications are considered safe during pregnancy.
Dupixent Pregnancy And Lactation Text: This medication likely crosses the placenta but the risk for the fetus is uncertain. This medication is excreted in breast milk.
Odomzo Counseling- I discussed with the patient the risks of Odomzo including but not limited to nausea, vomiting, diarrhea, constipation, weight loss, changes in the sense of taste, decreased appetite, muscle spasms, and hair loss.  The patient verbalized understanding of the proper use and possible adverse effects of Odomzo.  All of the patient's questions and concerns were addressed.
Benzoyl Peroxide Counseling: Patient counseled that medicine may cause skin irritation and bleach clothing.  In the event of skin irritation, the patient was advised to reduce the amount of the drug applied or use it less frequently.   The patient verbalized understanding of the proper use and possible adverse effects of benzoyl peroxide.  All of the patient's questions and concerns were addressed.
Valtrex Counseling: I discussed with the patient the risks of valacyclovir including but not limited to kidney damage, nausea, vomiting and severe allergy.  The patient understands that if the infection seems to be worsening or is not improving, they are to call.
Zepbound Counseling: I reviewed the possible side effects including: thyroid tumors, kidney disease, gallbladder disease, abdominal pain, constipation, diarrhea, nausea, vomiting and pancreatitis. Do not take this medication if you have a history or family history of multiple endocrine neoplasia syndrome type 2. Side effects reviewed, pt to contact office should one occur.
Litfulo Counseling: I discussed with the patient the risks of Litfulo therapy including but not limited to upper respiratory tract infections, shingles, cold sores, and nausea. Live vaccines should be avoided.  This medication has been linked to serious infections; higher rate of mortality; malignancy and lymphoproliferative disorders; major adverse cardiovascular events; thrombosis; gastrointestinal perforations; neutropenia; lymphopenia; anemia; liver enzyme elevations; and lipid elevations.
Itraconazole Counseling:  I discussed with the patient the risks of itraconazole including but not limited to liver damage, nausea/vomiting, neuropathy, and severe allergy.  The patient understands that this medication is best absorbed when taken with acidic beverages such as non-diet cola or ginger ale.  The patient understands that monitoring is required including baseline LFTs and repeat LFTs at intervals.  The patient understands that they are to contact us or the primary physician if concerning signs are noted.
Simlandi Counseling:  I discussed with the patient the risks of adalimumab including but not limited to myelosuppression, immunosuppression, autoimmune hepatitis, demyelinating diseases, lymphoma, and serious infections.  The patient understands that monitoring is required including a PPD at baseline and must alert us or the primary physician if symptoms of infection or other concerning signs are noted.
Metronidazole Pregnancy And Lactation Text: This medication is Pregnancy Category B and considered safe during pregnancy.  It is also excreted in breast milk.
Birth Control Pills Pregnancy And Lactation Text: This medication should be avoided if pregnant and for the first 30 days post-partum.
Sotyktu Pregnancy And Lactation Text: There is insufficient data to evaluate whether or not Sotyktu is safe to use during pregnancy.   It is not known if Sotyktu passes into breast milk and whether or not it is safe to use when breastfeeding.  
Ebglyss Counseling: I discussed with the patient the risks of lebrikizumab including but not limited to eye inflammation and irritation, cold sores, injection site reactions, allergic reactions and increased risk of parasitic infection. The patient understands that monitoring is required and they must alert us or the primary physician if symptoms of infection or other concerning signs are noted.
Cyclosporine Counseling:  I discussed with the patient the risks of cyclosporine including but not limited to hypertension, gingival hyperplasia,myelosuppression, immunosuppression, liver damage, kidney damage, neurotoxicity, lymphoma, and serious infections. The patient understands that monitoring is required including baseline blood pressure, CBC, CMP, lipid panel and uric acid, and then 1-2 times monthly CMP and blood pressure.
Doxepin Pregnancy And Lactation Text: This medication is Pregnancy Category C and it isn't known if it is safe during pregnancy. It is also excreted in breast milk and breast feeding isn't recommended.
Nsaids Counseling: NSAID Counseling: I discussed with the patient that NSAIDs should be taken with food. Prolonged use of NSAIDs can result in the development of stomach ulcers.  Patient advised to stop taking NSAIDs if abdominal pain occurs.  The patient verbalized understanding of the proper use and possible adverse effects of NSAIDs.  All of the patient's questions and concerns were addressed.
Solaraze Pregnancy And Lactation Text: This medication is Pregnancy Category B and is considered safe. There is some data to suggest avoiding during the third trimester. It is unknown if this medication is excreted in breast milk.
Odomzo Pregnancy And Lactation Text: This medication is Pregnancy Category X and is absolutely contraindicated during pregnancy. It is unknown if it is excreted in breast milk.
Litfulo Pregnancy And Lactation Text: Based on animal studies, Lifulo may cause embryo-fetal harm when administered to pregnant women.  The medication should not be used in pregnancy.  Breastfeeding is not recommended during treatment.
Benzoyl Peroxide Pregnancy And Lactation Text: This medication is Pregnancy Category C. It is unknown if benzoyl peroxide is excreted in breast milk.
Arava Counseling:  Patient counseled regarding adverse effects of Arava including but not limited to nausea, vomiting, abnormalities in liver function tests. Patients may develop mouth sores, rash, diarrhea, and abnormalities in blood counts. The patient understands that monitoring is required including LFTs and blood counts.  There is a rare possibility of scarring of the liver and lung problems that can occur when taking methotrexate. Persistent nausea, loss of appetite, pale stools, dark urine, cough, and shortness of breath should be reported immediately. Patient advised to discontinue Arava treatment and consult with a physician prior to attempting conception. The patient will have to undergo a treatment to eliminate Arava from the body prior to conception.
Klisyri Counseling:  I discussed with the patient the risks of Klisyri including but not limited to erythema, scaling, itching, weeping, crusting, and pain.
Hydroxyzine Counseling: Patient advised that the medication is sedating and not to drive a car after taking this medication.  Patient informed of potential adverse effects including but not limited to dry mouth, urinary retention, and blurry vision.  The patient verbalized understanding of the proper use and possible adverse effects of hydroxyzine.  All of the patient's questions and concerns were addressed.
Spironolactone Counseling: Patient advised regarding risks of diarrhea, abdominal pain, hyperkalemia, birth defects (for female patients), liver toxicity and renal toxicity. The patient may need blood work to monitor liver and kidney function and potassium levels while on therapy. The patient verbalized understanding of the proper use and possible adverse effects of spironolactone.  All of the patient's questions and concerns were addressed.
Valtrex Pregnancy And Lactation Text: this medication is Pregnancy Category B and is considered safe during pregnancy. This medication is not directly found in breast milk but it's metabolite acyclovir is present.
Winlevi Counseling:  I discussed with the patient the risks of topical clascoterone including but not limited to erythema, scaling, itching, and stinging. Patient voiced their understanding.
Nsaids Pregnancy And Lactation Text: These medications are considered safe up to 30 weeks gestation. It is excreted in breast milk.
Minocycline Counseling: Patient advised regarding possible photosensitivity and discoloration of the teeth, skin, lips, tongue and gums.  Patient instructed to avoid sunlight, if possible.  When exposed to sunlight, patients should wear protective clothing, sunglasses, and sunscreen.  The patient was instructed to call the office immediately if the following severe adverse effects occur:  hearing changes, easy bruising/bleeding, severe headache, or vision changes.  The patient verbalized understanding of the proper use and possible adverse effects of minocycline.  All of the patient's questions and concerns were addressed.
Olumiant Counseling: I discussed with the patient the risks of Olumiant therapy including but not limited to upper respiratory tract infections, shingles, cold sores, and nausea. Live vaccines should be avoided.  This medication has been linked to serious infections; higher rate of mortality; malignancy and lymphoproliferative disorders; major adverse cardiovascular events; thrombosis; gastrointestinal perforations; neutropenia; lymphopenia; anemia; liver enzyme elevations; and lipid elevations.
Soolantra Counseling: I discussed with the patients the risks of topial Soolantra. This is a medicine which decreases the number of mites and inflammation in the skin. You experience burning, stinging, eye irritation or allergic reactions.  Please call our office if you develop any problems from using this medication.
Ebglyss Pregnancy And Lactation Text: This medication likely crosses the placenta but the risk for the fetus is uncertain. It is unknown if this medication is excreted in breast milk.
Detail Level: Simple
Soolantra Pregnancy And Lactation Text: This medication is Pregnancy Category C. This medication is considered safe during breast feeding.
Hydroxyzine Pregnancy And Lactation Text: This medication is not safe during pregnancy and should not be taken. It is also excreted in breast milk and breast feeding isn't recommended.
Enbrel Counseling:  I discussed with the patient the risks of etanercept including but not limited to myelosuppression, immunosuppression, autoimmune hepatitis, demyelinating diseases, lymphoma, and infections.  The patient understands that monitoring is required including a PPD at baseline and must alert us or the primary physician if symptoms of infection or other concerning signs are noted.
Klisyri Pregnancy And Lactation Text: It is unknown if this medication can harm a developing fetus or if it is excreted in breast milk.
Olanzapine Counseling- I discussed with the patient the common side effects of olanzapine including but are not limited to: lack of energy, dry mouth, increased appetite, sleepiness, tremor, constipation, dizziness, changes in behavior, or restlessness.  Explained that teenagers are more likely to experience headaches, abdominal pain, pain in the arms or legs, tiredness, and sleepiness.  Serious side effects include but are not limited: increased risk of death in elderly patients who are confused, have memory loss, or dementia-related psychosis; hyperglycemia; increased cholesterol and triglycerides; and weight gain.
Ketoconazole Counseling:   Patient counseled regarding improving absorption with orange juice.  Adverse effects include but are not limited to breast enlargement, headache, diarrhea, nausea, upset stomach, liver function test abnormalities, taste disturbance, and stomach pain.  There is a rare possibility of liver failure that can occur when taking ketoconazole. The patient understands that monitoring of LFTs may be required, especially at baseline. The patient verbalized understanding of the proper use and possible adverse effects of ketoconazole.  All of the patient's questions and concerns were addressed.
Simponi Counseling:  I discussed with the patient the risks of golimumab including but not limited to myelosuppression, immunosuppression, autoimmune hepatitis, demyelinating diseases, lymphoma, and serious infections.  The patient understands that monitoring is required including a PPD at baseline and must alert us or the primary physician if symptoms of infection or other concerning signs are noted.
Use Enhanced Medication Counseling?: No
Carac Counseling:  I discussed with the patient the risks of Carac including but not limited to erythema, scaling, itching, weeping, crusting, and pain.
Spironolactone Pregnancy And Lactation Text: This medication can cause feminization of the male fetus and should be avoided during pregnancy. The active metabolite is also found in breast milk.
Winlevi Pregnancy And Lactation Text: This medication is considered safe during pregnancy and breastfeeding.
Ketoconazole Pregnancy And Lactation Text: This medication is Pregnancy Category C and it isn't know if it is safe during pregnancy. It is also excreted in breast milk and breast feeding isn't recommended.
Xolair Counseling:  Patient informed of potential adverse effects including but not limited to fever, muscle aches, rash and allergic reactions.  The patient verbalized understanding of the proper use and possible adverse effects of Xolair.  All of the patient's questions and concerns were addressed.
Methotrexate Counseling:  Patient counseled regarding adverse effects of methotrexate including but not limited to nausea, vomiting, abnormalities in liver function tests. Patients may develop mouth sores, rash, diarrhea, and abnormalities in blood counts. The patient understands that monitoring is required including LFT's and blood counts.  There is a rare possibility of scarring of the liver and lung problems that can occur when taking methotrexate. Persistent nausea, loss of appetite, pale stools, dark urine, cough, and shortness of breath should be reported immediately. Patient advised to discontinue methotrexate treatment at least three months before attempting to become pregnant.  I discussed the need for folate supplements while taking methotrexate.  These supplements can decrease side effects during methotrexate treatment. The patient verbalized understanding of the proper use and possible adverse effects of methotrexate.  All of the patient's questions and concerns were addressed.
Olumiant Pregnancy And Lactation Text: Based on animal studies, Olumiant may cause embryo-fetal harm when administered to pregnant women.  The medication should not be used in pregnancy.  Breastfeeding is not recommended during treatment.
Clofazimine Counseling:  I discussed with the patient the risks of clofazimine including but not limited to skin and eye pigmentation, liver damage, nausea/vomiting, gastrointestinal bleeding and allergy.
Minoxidil Counseling: Minoxidil is a topical medication which can increase blood flow where it is applied. It is uncertain how this medication increases hair growth. Side effects are uncommon and include stinging and allergic reactions.
VTAMA Counseling: I discussed with the patient that VTAMA is not for use in the eyes, mouth or mouth. They should call the office if they develop any signs of allergic reactions to VTAMA. The patient verbalized understanding of the proper use and possible adverse effects of VTAMA.  All of the patient's questions and concerns were addressed.
Xolair Pregnancy And Lactation Text: This medication is Pregnancy Category B and is considered safe during pregnancy. This medication is excreted in breast milk.
Quinolones Counseling:  I discussed with the patient the risks of fluoroquinolones including but not limited to GI upset, allergic reaction, drug rash, diarrhea, dizziness, photosensitivity, yeast infections, liver function test abnormalities, tendonitis/tendon rupture.
Methotrexate Pregnancy And Lactation Text: This medication is Pregnancy Category X and is known to cause fetal harm. This medication is excreted in breast milk.
Topical Retinoid counseling:  Patient advised to apply a pea-sized amount only at bedtime and wait 30 minutes after washing their face before applying.  If too drying, patient may add a non-comedogenic moisturizer. The patient verbalized understanding of the proper use and possible adverse effects of retinoids.  All of the patient's questions and concerns were addressed.
Olanzapine Pregnancy And Lactation Text: This medication is pregnancy category C.   There are no adequate and well controlled trials with olanzapine in pregnant females.  Olanzapine should be used during pregnancy only if the potential benefit justifies the potential risk to the fetus.   In a study in lactating healthy women, olanzapine was excreted in breast milk.  It is recommended that women taking olanzapine should not breast feed.
Albendazole Counseling:  I discussed with the patient the risks of albendazole including but not limited to cytopenia, kidney damage, nausea/vomiting and severe allergy.  The patient understands that this medication is being used in an off-label manner.
Nemluvio Pregnancy And Lactation Text: It is not known if Nemluvio causes fetal harm or is present in breast milk. Please proceed with caution if patients who are pregnant or breastfeeding.
Clindamycin Pregnancy And Lactation Text: This medication can be used in pregnancy if certain situations. Clindamycin is also present in breast milk.
Azathioprine Counseling:  I discussed with the patient the risks of azathioprine including but not limited to myelosuppression, immunosuppression, hepatotoxicity, lymphoma, and infections.  The patient understands that monitoring is required including baseline LFTs, Creatinine, possible TPMP genotyping and weekly CBCs for the first month and then every 2 weeks thereafter.  The patient verbalized understanding of the proper use and possible adverse effects of azathioprine.  All of the patient's questions and concerns were addressed.
Sski Pregnancy And Lactation Text: This medication is Pregnancy Category D and isn't considered safe during pregnancy. It is excreted in breast milk.
Dutasteride Pregnancy And Lactation Text: This medication is absolutely contraindicated in women, especially during pregnancy and breast feeding. Feminization of male fetuses is possible if taking while pregnant.
Topical Steroids Counseling: I discussed with the patient that prolonged use of topical steroids can result in the increased appearance of superficial blood vessels (telangiectasias), lightening (hypopigmentation) and thinning of the skin (atrophy).  Patient understands to avoid using high potency steroids in skin folds, the groin or the face.  The patient verbalized understanding of the proper use and possible adverse effects of topical steroids.  All of the patient's questions and concerns were addressed.
Eucrisa Counseling: Patient may experience a mild burning sensation during topical application. Eucrisa is not approved in children less than 3 months of age.
Doxycycline Counseling:  Patient counseled regarding possible photosensitivity and increased risk for sunburn.  Patient instructed to avoid sunlight, if possible.  When exposed to sunlight, patients should wear protective clothing, sunglasses, and sunscreen.  The patient was instructed to call the office immediately if the following severe adverse effects occur:  hearing changes, easy bruising/bleeding, severe headache, or vision changes.  The patient verbalized understanding of the proper use and possible adverse effects of doxycycline.  All of the patient's questions and concerns were addressed.
Qbrexza Counseling:  I discussed with the patient the risks of Qbrexza including but not limited to headache, mydriasis, blurred vision, dry eyes, nasal dryness, dry mouth, dry throat, dry skin, urinary hesitation, and constipation.  Local skin reactions including erythema, burning, stinging, and itching can also occur.
Cimzia Pregnancy And Lactation Text: This medication crosses the placenta but can be considered safe in certain situations. Cimzia may be excreted in breast milk.
Hydroxychloroquine Pregnancy And Lactation Text: This medication has been shown to cause fetal harm but it isn't assigned a Pregnancy Risk Category. There are small amounts excreted in breast milk.
Topical Steroids Applications Pregnancy And Lactation Text: Most topical steroids are considered safe to use during pregnancy and lactation.  Any topical steroid applied to the breast or nipple should be washed off before breastfeeding.
Aklief counseling:  Patient advised to apply a pea-sized amount only at bedtime and wait 30 minutes after washing their face before applying.  If too drying, patient may add a non-comedogenic moisturizer.  The most commonly reported side effects including irritation, redness, scaling, dryness, stinging, burning, itching, and increased risk of sunburn.  The patient verbalized understanding of the proper use and possible adverse effects of retinoids.  All of the patient's questions and concerns were addressed.
Erivedge Counseling- I discussed with the patient the risks of Erivedge including but not limited to nausea, vomiting, diarrhea, constipation, weight loss, changes in the sense of taste, decreased appetite, muscle spasms, and hair loss.  The patient verbalized understanding of the proper use and possible adverse effects of Erivedge.  All of the patient's questions and concerns were addressed.
Zyclara Counseling:  I discussed with the patient the risks of imiquimod including but not limited to erythema, scaling, itching, weeping, crusting, and pain.  Patient understands that the inflammatory response to imiquimod is variable from person to person and was educated regarded proper titration schedule.  If flu-like symptoms develop, patient knows to discontinue the medication and contact us.
Azathioprine Pregnancy And Lactation Text: This medication is Pregnancy Category D and isn't considered safe during pregnancy. It is unknown if this medication is excreted in breast milk.
Qbrexza Pregnancy And Lactation Text: There is no available data on Qbrexza use in pregnant women.  There is no available data on Qbrexza use in lactation.
Bexarotene Pregnancy And Lactation Text: This medication is Pregnancy Category X and should not be given to women who are pregnant or may become pregnant. This medication should not be used if you are breast feeding.
Thalidomide Counseling: I discussed with the patient the risks of thalidomide including but not limited to birth defects, anxiety, weakness, chest pain, dizziness, cough and severe allergy.
Albendazole Pregnancy And Lactation Text: This medication is Pregnancy Category C and it isn't known if it is safe during pregnancy. It is also excreted in breast milk.
Semaglutide Counseling: I reviewed the possible side effects including: thyroid tumors, kidney disease, gallbladder disease, abdominal pain, constipation, diarrhea, nausea, vomiting and pancreatitis. Do not take this medication if you have a history or family history of multiple endocrine neoplasia syndrome type 2. Side effects reviewed, pt to contact office should one occur.
Rituxan Counseling:  I discussed with the patient the risks of Rituxan infusions. Side effects can include infusion reactions, severe drug rashes including mucocutaneous reactions, reactivation of latent hepatitis and other infections and rarely progressive multifocal leukoencephalopathy.  All of the patient's questions and concerns were addressed.
Fluconazole Counseling:  Patient counseled regarding adverse effects of fluconazole including but not limited to headache, diarrhea, nausea, upset stomach, liver function test abnormalities, taste disturbance, and stomach pain.  There is a rare possibility of liver failure that can occur when taking fluconazole.  The patient understands that monitoring of LFTs and kidney function test may be required, especially at baseline. The patient verbalized understanding of the proper use and possible adverse effects of fluconazole.  All of the patient's questions and concerns were addressed.
Doxycycline Pregnancy And Lactation Text: This medication is Pregnancy Category D and not consider safe during pregnancy. It is also excreted in breast milk but is considered safe for shorter treatment courses.
Finasteride Male Counseling: Finasteride Counseling:  I discussed with the patient the risks of use of finasteride including but not limited to decreased libido, decreased ejaculate volume, gynecomastia, and depression. Women should not handle medication.  All of the patient's questions and concerns were addressed.
Ivermectin Counseling:  Patient instructed to take medication on an empty stomach with a full glass of water.  Patient informed of potential adverse effects including but not limited to nausea, diarrhea, dizziness, itching, and swelling of the extremities or lymph nodes.  The patient verbalized understanding of the proper use and possible adverse effects of ivermectin.  All of the patient's questions and concerns were addressed.
Cosentyx Counseling:  I discussed with the patient the risks of Cosentyx including but not limited to worsening of Crohn's disease, immunosuppression, allergic reactions and infections.  The patient understands that monitoring is required including a PPD at baseline and must alert us or the primary physician if symptoms of infection or other concerning signs are noted.
Cellcept Counseling:  I discussed with the patient the risks of mycophenolate mofetil including but not limited to infection/immunosuppression, GI upset, hypokalemia, hypercholesterolemia, bone marrow suppression, lymphoproliferative disorders, malignancy, GI ulceration/bleed/perforation, colitis, interstitial lung disease, kidney failure, progressive multifocal leukoencephalopathy, and birth defects.  The patient understands that monitoring is required including a baseline creatinine and regular CBC testing. In addition, patient must alert us immediately if symptoms of infection or other concerning signs are noted.
Isotretinoin Counseling: Patient should get monthly blood tests, not donate blood, not drive at night if vision affected, not share medication, and not undergo elective surgery for 6 months after tx completed. Side effects reviewed, pt to contact office should one occur.
Low Dose Naltrexone Counseling- I discussed with the patient the potential risks and side effects of low dose naltrexone including but not limited to: more vivid dreams, headaches, nausea, vomiting, abdominal pain, fatigue, dizziness, and anxiety.
Rituxan Pregnancy And Lactation Text: This medication is Pregnancy Category C and it isn't know if it is safe during pregnancy. It is unknown if this medication is excreted in breast milk but similar antibodies are known to be excreted.
Hydroquinone Counseling:  Patient advised that medication may result in skin irritation, lightening (hypopigmentation), dryness, and burning.  In the event of skin irritation, the patient was advised to reduce the amount of the drug applied or use it less frequently.  Rarely, spots that are treated with hydroquinone can become darker (pseudoochronosis).  Should this occur, patient instructed to stop medication and call the office. The patient verbalized understanding of the proper use and possible adverse effects of hydroquinone.  All of the patient's questions and concerns were addressed.
Cimetidine Counseling:  I discussed with the patient the risks of Cimetidine including but not limited to gynecomastia, headache, diarrhea, nausea, drowsiness, arrhythmias, pancreatitis, skin rashes, psychosis, bone marrow suppression and kidney toxicity.
Finasteride Female Counseling: Finasteride Counseling:  I discussed with the patient the risks of use of finasteride including but not limited to decreased libido and sexual dysfunction. Explained the teratogenic nature of the medication and stressed the importance of not getting pregnant during treatment. All of the patient's questions and concerns were addressed.
Aklief Pregnancy And Lactation Text: It is unknown if this medication is safe to use during pregnancy.  It is unknown if this medication is excreted in breast milk.  Breastfeeding women should use the topical cream on the smallest area of the skin for the shortest time needed while breastfeeding.  Do not apply to nipple and areola.
Topical Sulfur Applications Counseling: Topical Sulfur Counseling: Patient counseled that this medication may cause skin irritation or allergic reactions.  In the event of skin irritation, the patient was advised to reduce the amount of the drug applied or use it less frequently.   The patient verbalized understanding of the proper use and possible adverse effects of topical sulfur application.  All of the patient's questions and concerns were addressed.
Cantharidin Pregnancy And Lactation Text: This medication has not been proven safe during pregnancy. It is unknown if this medication is excreted in breast milk.
Isotretinoin Pregnancy And Lactation Text: This medication is Pregnancy Category X and is considered extremely dangerous during pregnancy. It is unknown if it is excreted in breast milk.
Low Dose Naltrexone Pregnancy And Lactation Text: Naltrexone is pregnancy category C.  There have been no adequate and well-controlled studies in pregnant women.  It should be used in pregnancy only if the potential benefit justifies the potential risk to the fetus.   Limited data indicates that naltrexone is minimally excreted into breastmilk.
Wegovy Counseling: I reviewed the possible side effects including: thyroid tumors, kidney disease, gallbladder disease, abdominal pain, constipation, diarrhea, nausea, vomiting and pancreatitis. Do not take this medication if you have a history or family history of multiple endocrine neoplasia syndrome type 2. Side effects reviewed, pt to contact office should one occur.
Erythromycin Counseling:  I discussed with the patient the risks of erythromycin including but not limited to GI upset, allergic reaction, drug rash, diarrhea, increase in liver enzymes, and yeast infections.
Cibinqo Counseling: I discussed with the patient the risks of Cibinqo therapy including but not limited to common cold, nausea, headache, cold sores, increased blood CPK levels, dizziness, UTIs, fatigue, acne, and vomitting. Live vaccines should be avoided.  This medication has been linked to serious infections; higher rate of mortality; malignancy and lymphoproliferative disorders; major adverse cardiovascular events; thrombosis; thrombocytopenia and lymphopenia; lipid elevations; and retinal detachment.
Rhofade Counseling: Rhofade is a topical medication which can decrease superficial blood flow where applied. Side effects are uncommon and include stinging, redness and allergic reactions.
Dupixent Counseling: I discussed with the patient the risks of dupilumab including but not limited to eye inflammation and irritation, cold sores, injection site reactions, allergic reactions and increased risk of parasitic infection. The patient understands that monitoring is required and they must alert us or the primary physician if symptoms of infection or other concerning signs are noted.
Libtayo Counseling- I discussed with the patient the risks of Libtayo including but not limited to nausea, vomiting, diarrhea, and bone or muscle pain.  The patient verbalized understanding of the proper use and possible adverse effects of Libtayo.  All of the patient's questions and concerns were addressed.
High Dose Vitamin A Counseling: Side effects reviewed, pt to contact office should one occur.
Griseofulvin Counseling:  I discussed with the patient the risks of griseofulvin including but not limited to photosensitivity, cytopenia, liver damage, nausea/vomiting and severe allergy.  The patient understands that this medication is best absorbed when taken with a fatty meal (e.g., ice cream or french fries).
Niacinamide Counseling: I recommended taking niacin or niacinamide, also know as vitamin B3, twice daily. Recent evidence suggests that taking vitamin B3 (500 mg twice daily) can reduce the risk of actinic keratoses and non-melanoma skin cancers. Side effects of vitamin B3 include flushing and headache.
Siliq Counseling:  I discussed with the patient the risks of Siliq including but not limited to new or worsening depression, suicidal thoughts and behavior, immunosuppression, malignancy, posterior leukoencephalopathy syndrome, and serious infections.  The patient understands that monitoring is required including a PPD at baseline and must alert us or the primary physician if symptoms of infection or other concerning signs are noted. There is also a special program designed to monitor depression which is required with Siliq.
Erythromycin Pregnancy And Lactation Text: This medication is Pregnancy Category B and is considered safe during pregnancy. It is also excreted in breast milk.
Azelaic Acid Counseling: Patient counseled that medicine may cause skin irritation and to avoid applying near the eyes.  In the event of skin irritation, the patient was advised to reduce the amount of the drug applied or use it less frequently.   The patient verbalized understanding of the proper use and possible adverse effects of azelaic acid.  All of the patient's questions and concerns were addressed.
Tranexamic Acid Counseling:  Patient advised of the small risk of bleeding problems with tranexamic acid. They were also instructed to call if they developed any nausea, vomiting or diarrhea. All of the patient's questions and concerns were addressed.
Topical Sulfur Applications Pregnancy And Lactation Text: This medication is considered safe during pregnancy and breast feeding secondary to limited systemic absorption.
Finasteride Pregnancy And Lactation Text: This medication is absolutely contraindicated during pregnancy. It is unknown if it is excreted in breast milk.
Griseofulvin Pregnancy And Lactation Text: This medication is Pregnancy Category X and is known to cause serious birth defects. It is unknown if this medication is excreted in breast milk but breast feeding should be avoided.
Cyclophosphamide Counseling:  I discussed with the patient the risks of cyclophosphamide including but not limited to hair loss, hormonal abnormalities, decreased fertility, abdominal pain, diarrhea, nausea and vomiting, bone marrow suppression and infection. The patient understands that monitoring is required while taking this medication.
Libtayo Pregnancy And Lactation Text: This medication is contraindicated in pregnancy and when breast feeding.
Cibinqo Pregnancy And Lactation Text: It is unknown if this medication will adversely affect pregnancy or breast feeding.  You should not take this medication if you are currently pregnant or planning a pregnancy or while breastfeeding.

## 2025-02-17 ENCOUNTER — TELEPHONE ENCOUNTER (OUTPATIENT)
Dept: URBAN - METROPOLITAN AREA CLINIC 7 | Facility: CLINIC | Age: 60
End: 2025-02-17

## 2025-02-17 RX ORDER — TENAPANOR HYDROCHLORIDE 53.2 MG/1
1 TABLET IMMEDIATELY BEFORE MEALS TABLET ORAL TWICE A DAY
Qty: 180 TABLET | Refills: 3
Start: 2024-05-06 | End: 2026-02-12

## 2025-02-18 ENCOUNTER — OFFICE VISIT (OUTPATIENT)
Dept: URBAN - METROPOLITAN AREA CLINIC 7 | Facility: CLINIC | Age: 60
End: 2025-02-18
Payer: OTHER GOVERNMENT

## 2025-02-18 ENCOUNTER — OFFICE VISIT (OUTPATIENT)
Dept: URBAN - METROPOLITAN AREA CLINIC 7 | Facility: CLINIC | Age: 60
End: 2025-02-18

## 2025-02-18 VITALS
SYSTOLIC BLOOD PRESSURE: 130 MMHG | DIASTOLIC BLOOD PRESSURE: 70 MMHG | HEIGHT: 66 IN | WEIGHT: 123 LBS | RESPIRATION RATE: 16 BRPM | HEART RATE: 74 BPM | TEMPERATURE: 97.6 F | BODY MASS INDEX: 19.77 KG/M2

## 2025-02-18 DIAGNOSIS — M62.89 PELVIC FLOOR DYSFUNCTION IN FEMALE: ICD-10-CM

## 2025-02-18 DIAGNOSIS — R10.9 FUNCTIONAL ABDOMINAL PAIN SYNDROME: ICD-10-CM

## 2025-02-18 DIAGNOSIS — T81.89XS SUTURE GRANULOMA, SEQUELA: ICD-10-CM

## 2025-02-18 DIAGNOSIS — R19.8 VISCERAL HYPERALGESIA: ICD-10-CM

## 2025-02-18 DIAGNOSIS — K58.1 IRRITABLE BOWEL SYNDROME WITH CONSTIPATION: ICD-10-CM

## 2025-02-18 DIAGNOSIS — K63.89 SMALL INTESTINAL BACTERIAL OVERGROWTH (SIBO): ICD-10-CM

## 2025-02-18 DIAGNOSIS — R93.5 ABNORMAL US (ULTRASOUND) OF ABDOMEN: ICD-10-CM

## 2025-02-18 DIAGNOSIS — K59.00 CONSTIPATION, UNSPECIFIED CONSTIPATION TYPE: ICD-10-CM

## 2025-02-18 DIAGNOSIS — R14.0 BLOATING: ICD-10-CM

## 2025-02-18 PROCEDURE — 99214 OFFICE O/P EST MOD 30 MIN: CPT | Performed by: INTERNAL MEDICINE

## 2025-02-18 RX ORDER — ONDANSETRON 4 MG/1
1 TABLET ON THE TONGUE AND ALLOW TO DISSOLVE TABLET, ORALLY DISINTEGRATING ORAL
Qty: 30 TABLET | Refills: 3 | Status: ACTIVE | COMMUNITY

## 2025-02-18 RX ORDER — DICYCLOMINE HYDROCHLORIDE 20 MG/1
TAKE 1 TABLET BY MOUTH THREE TIMES DAILY TABLET ORAL
Qty: 270 TABLET | Refills: 3 | Status: ACTIVE | COMMUNITY

## 2025-02-18 RX ORDER — ESCITALOPRAM OXALATE 20 MG/1
1 TABLET TABLET, FILM COATED ORAL ONCE A DAY
Status: ACTIVE | COMMUNITY

## 2025-02-18 RX ORDER — ALPRAZOLAM 1 MG/1
1 TABLET TABLET ORAL TWICE A DAY
Status: ACTIVE | COMMUNITY

## 2025-02-18 RX ORDER — ALPRAZOLAM 1 MG/1
TABLET ORAL
Qty: 30 TABLET | Status: ACTIVE | COMMUNITY

## 2025-02-18 RX ORDER — BENAZEPRIL HYDROCHLORIDE 20 MG/1
1 TABLET TABLET ORAL ONCE A DAY
Status: ACTIVE | COMMUNITY

## 2025-02-18 RX ORDER — HYDROCHLOROTHIAZIDE 12.5 MG/1
1 TABLET IN THE MORNING TABLET ORAL ONCE A DAY
Status: ACTIVE | COMMUNITY

## 2025-02-18 NOTE — HPI-TODAY'S VISIT:
ROBERTH 5/2024. 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  February 2019 EGD EUS with 2 cm hiatal hernia, Schatzki ring, antral gastropathy with H. pylori biopsies negative, and normal duodenum.  She also had an endoscopy in [place] in 2021 with biopsies once again negative for H. pylori and celiac disease.  Colonoscopy August 2021 demonstrated diverticulosis and otherwise normal colon and ileum with the exception of internal hemorrhoids.  EUS in August 2021 demonstrated normal duodenum, no pathology in the pancreatic head, no bile duct pathology, and no pancreatic duct pathology.  CT scan July 2021 with contrast demonstrated evidence of a distal pancreatectomy and splenectomy and was otherwise normal.  Very functional presentation, with emotional presentations over the last several years. Has been in tears in the office several times over the years stating her abdominal pain is overwhelming and she has demonstrated significant visceral hypersensitivity. She had a bad reaction to amitriptyline, failed Trulance, Linzess.  She was using benzos and alcohol to help her pain in the past.  Small intestinal bacterial overgrowth testing was negative.  Librax was used for pain as well as Zofran Phenergan.  Despite all this, she is continued with symptoms including cramping pain and constipation.  Duloxetine was also started and she felt weird with it so she stopped it.  She had an MRI defecography in December 2021 which demonstrated incomplete evacuation with a third or more of the contrast gel left in her rectum despite strong effort, moderate to severe pelvic floor descent, moderate rectocele measuring 2.3 cm, and possible paradoxical contraction. Tried on Motegrity and having some bowel movements, but still continued with issues with abdominal pain and urgency.  She felt like she had lots of intestinal gas.  Advised at that time that she continue her bowel regimen with Motegrity MiraLAX and Benefiber, stop duloxetine, and referred her to pelvic floor physical therapy as well as urogynecology given her rectocele. Overall impression has been that of a very complex patient with ongoing pain symptoms that are chronic, out of proportion to objective findings, and have been refractory to many treatments. Advised that she see pain management for assistance with pain modulating medications and her overall chronic pain syndrome. She saw uro-gyn and was referred to PFPT. Have also referred her to dietician but she deferred. CT scan of the abdomen pelvis which was done in January 2022 which demonstrated a prior splenectomy and atrophic pancreatic tail and pelvic varices. Had imaging done at 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 She also had evidence of median arcuate syndrome. Continued with chronic abdominal pain at this point, several years worth of this. She tried Linzess and it gave her diarrhea, and pain persisted so she stopped the medication. She had pelvic floor physicial therapy for 8 sessions. 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  Thus, since her last visit with me via phone I did suggest that she see vascular surgery for consideration of TALA stenting and also suggested that she get a CT angiogram to evaluate the celiac trunk and then consideration for MAL release.  She then saw Dr. Gottlieb in the office with request to do pancreatic elastase testing which was done in October and normal with a pancreatic elastase of 361. Also seems like since her last visit she was trying to switch to another GI practice locally but now is back seeing me. I sent Linzess 72 daily to help with her symptoms. Still feel she is very hypervisceral, but will rule out vascular issues as a possible organic etiology to her symptoms. Don't feel she needs feeding tube given her weight is largely stable compared to last visit. She has also had little luck with anti-depressants/anti-anxiety agents in the past so will not revisit this at this point. She continued to have burning epigastric pain, post-prandial discomfort. [place] Zenpep [place] and still having stomach pain, bloating all the time, hard for her to evacuate gas, stools. Difficulty with her morning BM. Had seen vascular surgery, vessels are all patent, no plans there. She has small stools, fragmented, incomplete. Elastase 361. Still doing 1 cap of miralax twice daily, doing fiber gummies.  Feels gassy, incomplete evacuation. Was taking dicyclomine QID. On Xanax BID. She continues to have incomplete evacuation, smaller stools, so constipated again. Was not on pancreatic enzymes anymore, although she was better initially on them. Do feel she has visceral hyperalgesia, pelvic floor dysfunction, IBS-C, and likely SIBO. She was to complete pelvic floor therapy in January which I agree with, did treatment for SIBO with antimicrobials/antifungals, Librax as needed, and added Trulance back. Referred to surgery for suture granuloma and sought vascular opinion on MALS given mesenteric US result. Has seen surgery but no plans for surgical intervention. Seems all physicians involved in her care favor issues to be functional. Weight 109 lbs. I did also refer her to Dr. Madden from vascular for opinion on MALS. Also did advised CBD. She did see a general surgeon at [place], and was told to take pancreatic enzymes, and to see Dr. Madden. He, however, reviewed her imaging and was told she does not have MALS. Did get CTA at . She did pelvic floor physical therapy, and anal dilators. Was using Miralax daily, and doing better. Still bloating, distention, although bowels are better. She is better now than she has been in a long time. She was much improved with PFPT and anal dilators. Was still having some bloating, so did combination therapy for SIBO and do Motilpro as well. She is doing a lot better from a constipation perspective. LV, she had moved to [place] and established with a GI in [place]. Was doing well for a while, but then started to feel worse. She then took metronidazole/fluconazole course, and felt better. She was then tetracycline four times per day, and started feeling better, but still having issues with evacuation. Started Amitiza but caused burning of her stomach, and then started on cascara sagrada 1-2x per day and this helped her. Not on SSRI right now. She had previously failed Linzess/Trulance (caused water diarrhea). Weight is up, eating well, no vomiting. Main issue continues to be constipation for the most part, consistent with IBS-C. Thus will try to do low dose IBSrela (50 mg once daily to every other day) to see if she can lower dose of cascara sagrada or stop using it. Still should take Miralax on a daily basis. Otherwise, she has come a long way from a couple years ago. She was on IBSrela for a while, but didnt work as well, but now back on it and having bowel movements more regularly for the last month. She was treated with tetracycline but wasn't as effective. She also tried Amitiza and Linzess again but wasn't as effective. Not using Miralax, no stimulant. Doing better on current dosing of IBSrela.

## 2025-07-07 ENCOUNTER — TELEPHONE ENCOUNTER (OUTPATIENT)
Dept: URBAN - METROPOLITAN AREA CLINIC 7 | Facility: CLINIC | Age: 60
End: 2025-07-07

## 2025-07-07 RX ORDER — PRUCALOPRIDE 2 MG/1
1 TABLET TABLET, FILM COATED ORAL ONCE A DAY
Qty: 30 | Refills: 11 | OUTPATIENT
Start: 2025-07-07